# Patient Record
Sex: MALE | Race: WHITE | URBAN - METROPOLITAN AREA
[De-identification: names, ages, dates, MRNs, and addresses within clinical notes are randomized per-mention and may not be internally consistent; named-entity substitution may affect disease eponyms.]

---

## 2017-08-07 ENCOUNTER — TELEPHONE (OUTPATIENT)
Dept: FAMILY MEDICINE CLINIC | Facility: CLINIC | Age: 57
End: 2017-08-07

## 2017-08-07 NOTE — TELEPHONE ENCOUNTER
Recevied fax from [facility] with cpap compliance download  Flowsheet updated    Alisha Conley, 08/07/17, 2:36 PM

## 2018-08-28 ENCOUNTER — TELEPHONE (OUTPATIENT)
Dept: FAMILY MEDICINE CLINIC | Facility: CLINIC | Age: 58
End: 2018-08-28

## 2018-08-28 NOTE — TELEPHONE ENCOUNTER
Received DME form from [facility] Contre[facility] for patient's CPAP supplies. Patient is overdue for an appt. Spoke with patient who states he does not use [facility] anymore, he orders everything online.   Patient asked for us not to send a script to [facility] at [facility]

## 2018-12-10 ENCOUNTER — TELEPHONE (OUTPATIENT)
Dept: FAMILY MEDICINE CLINIC | Facility: CLINIC | Age: 58
End: 2018-12-10

## 2018-12-10 DIAGNOSIS — G47.33 OSA (OBSTRUCTIVE SLEEP APNEA): Primary | ICD-10-CM

## 2018-12-10 NOTE — TELEPHONE ENCOUNTER
needs script for cpap supplies.  we do not take his insurance until [place] but needs them before can be seen

## 2018-12-10 NOTE — TELEPHONE ENCOUNTER
We do not currently take patient's insurance and therefore he cannot be seen right now  States his insurance will change in January and he will be able to come in  States he needs CPAP supplies right away  [facility] for order?     Torrey Segovia, 12/10/18, 1:29

## 2018-12-10 NOTE — TELEPHONE ENCOUNTER
Refill medications until next scheduled office visit by protocol.    Conchis Mccormick, 12/10/18, 2:03 PM

## 2019-01-07 ENCOUNTER — OFFICE VISIT (OUTPATIENT)
Dept: FAMILY MEDICINE CLINIC | Facility: CLINIC | Age: 59
End: 2019-01-07
Payer: MEDICAID

## 2019-01-07 VITALS
BODY MASS INDEX: 40.94 KG/M2 | TEMPERATURE: 99 F | HEART RATE: 104 BPM | HEIGHT: 70.5 IN | SYSTOLIC BLOOD PRESSURE: 144 MMHG | DIASTOLIC BLOOD PRESSURE: 98 MMHG | RESPIRATION RATE: 18 BRPM | OXYGEN SATURATION: 97 % | WEIGHT: 289.19 LBS

## 2019-01-07 DIAGNOSIS — G47.33 OSA (OBSTRUCTIVE SLEEP APNEA): Primary | ICD-10-CM

## 2019-01-07 PROCEDURE — 99214 OFFICE O/P EST MOD 30 MIN: CPT | Performed by: FAMILY MEDICINE

## 2019-01-07 RX ORDER — ASPIRIN 81 MG/1
TABLET ORAL
COMMUNITY
Start: 2016-09-06

## 2019-01-07 RX ORDER — DILTIAZEM HYDROCHLORIDE 60 MG/1
TABLET, FILM COATED ORAL
Refills: 2 | COMMUNITY
Start: 2018-09-19 | End: 2019-01-10

## 2019-01-07 RX ORDER — ENALAPRIL MALEATE 20 MG/1
TABLET ORAL
Refills: 2 | COMMUNITY
Start: 2018-09-19 | End: 2019-01-10

## 2019-01-07 RX ORDER — METOPROLOL TARTRATE 50 MG/1
TABLET, FILM COATED ORAL
COMMUNITY
Start: 2015-02-24 | End: 2019-01-10

## 2019-01-07 RX ORDER — WARFARIN SODIUM 2.5 MG/1
TABLET ORAL DAILY
COMMUNITY
Start: 2016-09-06 | End: 2019-01-22

## 2019-01-07 RX ORDER — DOXAZOSIN MESYLATE 4 MG/1
TABLET ORAL
Refills: 2 | COMMUNITY
Start: 2018-09-21 | End: 2019-01-10

## 2019-01-08 ENCOUNTER — TELEPHONE (OUTPATIENT)
Dept: FAMILY MEDICINE CLINIC | Facility: CLINIC | Age: 59
End: 2019-01-08

## 2019-01-08 NOTE — PROGRESS NOTES
[facility]  [facility]        HPI:   This is a 59year old male coming in for Patient presents with:  Obstructive Sleep Apnea (KANG)      HPI:  He is using 2 different mask as and had no insurance for awhile and he interchanges b  for 10-15 years and now helping with the [employer] in [place].      Social History    Tobacco Use      Smoking status: Never Smoker      Smokeless tobacco: Never Used    Alcohol use: No      Frequency: Never    Drug use: Not on file    Family Hi weight: 213 lb 12.2 oz    Vital signs reviewed. Physical Exam   Constitutional: He is oriented to person, place, and time. He appears well-developed and well-nourished. HENT:   Head: Normocephalic and atraumatic.    Right Ear: External ear normal.   Left changed every 6 month  with the [employer] medical [employer] provider. Meds & Refills for this Visit:  Requested Prescriptions      No prescriptions requested or ordered in this encounter       Outcome: Parent verbalizes understanding.  Parent is notif

## 2019-01-08 NOTE — TELEPHONE ENCOUNTER
Patient is transferring care to Dr. Sudhakar Garner from [facility]. . Currently on coumadin therapy for Afib since 2016. Takes coumadin 5mg  Five times per week. (alternating days on and off)     Last INR 2-3 weeks ago.   States his goal range has been 2

## 2019-01-10 ENCOUNTER — ANTI-COAG VISIT (OUTPATIENT)
Dept: FAMILY MEDICINE CLINIC | Facility: CLINIC | Age: 59
End: 2019-01-10
Payer: MEDICAID

## 2019-01-10 ENCOUNTER — OFFICE VISIT (OUTPATIENT)
Dept: FAMILY MEDICINE CLINIC | Facility: CLINIC | Age: 59
End: 2019-01-10
Payer: MEDICAID

## 2019-01-10 VITALS
WEIGHT: 290.81 LBS | DIASTOLIC BLOOD PRESSURE: 84 MMHG | BODY MASS INDEX: 41.17 KG/M2 | RESPIRATION RATE: 18 BRPM | OXYGEN SATURATION: 98 % | SYSTOLIC BLOOD PRESSURE: 136 MMHG | TEMPERATURE: 97 F | HEART RATE: 76 BPM | HEIGHT: 70.5 IN

## 2019-01-10 DIAGNOSIS — I10 ESSENTIAL HYPERTENSION: Primary | ICD-10-CM

## 2019-01-10 DIAGNOSIS — L40.9 PSORIASIS: ICD-10-CM

## 2019-01-10 DIAGNOSIS — I25.10 CORONARY ARTERY DISEASE INVOLVING NATIVE HEART WITHOUT ANGINA PECTORIS, UNSPECIFIED VESSEL OR LESION TYPE: ICD-10-CM

## 2019-01-10 DIAGNOSIS — I48.91 ATRIAL FIBRILLATION, UNSPECIFIED TYPE (HCC): ICD-10-CM

## 2019-01-10 LAB — INR: 4 (ref 0.8–1.2)

## 2019-01-10 PROCEDURE — 99214 OFFICE O/P EST MOD 30 MIN: CPT | Performed by: FAMILY MEDICINE

## 2019-01-10 PROCEDURE — 85610 PROTHROMBIN TIME: CPT | Performed by: FAMILY MEDICINE

## 2019-01-10 RX ORDER — DILTIAZEM HYDROCHLORIDE 60 MG/1
TABLET, FILM COATED ORAL
Qty: 60 TABLET | Refills: 2 | Status: SHIPPED | OUTPATIENT
Start: 2019-01-10 | End: 2019-04-11

## 2019-01-10 RX ORDER — DOXAZOSIN MESYLATE 4 MG/1
TABLET ORAL
Qty: 30 TABLET | Refills: 2 | Status: SHIPPED | OUTPATIENT
Start: 2019-01-10 | End: 2019-04-11

## 2019-01-10 RX ORDER — METOPROLOL TARTRATE 50 MG/1
50 TABLET, FILM COATED ORAL 2 TIMES DAILY
Qty: 60 TABLET | Refills: 0 | Status: SHIPPED | OUTPATIENT
Start: 2019-01-10 | End: 2019-02-18

## 2019-01-10 RX ORDER — ENALAPRIL MALEATE 20 MG/1
TABLET ORAL
Qty: 60 TABLET | Refills: 2 | Status: SHIPPED | OUTPATIENT
Start: 2019-01-10 | End: 2019-04-11

## 2019-01-10 RX ORDER — TRIAMCINOLONE ACETONIDE 5 MG/G
1 OINTMENT TOPICAL 2 TIMES DAILY
Qty: 60 G | Refills: 1 | Status: SHIPPED | OUTPATIENT
Start: 2019-01-10 | End: 2019-04-22

## 2019-01-10 NOTE — PATIENT INSTRUCTIONS
Continue current medications  Advice low salt diet and exercise. Monitor your blood pressure. Return to clinic if systolic blood pressure more than 264 or diastolic more than 951. Follow coumadin clinic. Obtain old records.

## 2019-01-10 NOTE — PROGRESS NOTES
[facility] [place]  PROGRESS NOTE  Chief Complaint:   Patient presents with:  Blood Pressure      HPI:   This is a 59year old male with history of hypertension, atrial fibrillation and psoriasis presents to establish care.   Patient has been cinthia tab daily Disp:  Rfl:       Counseling given: Not Answered         REVIEW OF SYSTEMS:   CONSTITUTIONAL:  Denies unusual weight gain/loss, fever, chills, or fatigue. EYES:  Denies eye pain, visual loss, blurred vision, double vision or yellow sclerae.    I other lymphadenopathy. MUSCULOSKELETAL: Normal ROM, no joint pain, or muscle weakness in all extremity. BACK: No tenderness, no spasm, SLR test negative, FROM. NEUROLOGICAL:  No deficit, normal gait, strength and tone, normal reflexes.   PSYCHIATRIC: A today.     Problem List:  Patient Active Problem List:     Atrial fibrillation [facility])     Cardiac polyp     Obstructive sleep apnea     Essential hypertension     Psoriasis     Coronary artery disease involving native heart without angina pectoris      [place]

## 2019-01-16 ENCOUNTER — TELEPHONE (OUTPATIENT)
Dept: FAMILY MEDICINE CLINIC | Facility: CLINIC | Age: 59
End: 2019-01-16

## 2019-01-21 ENCOUNTER — ANTI-COAG VISIT (OUTPATIENT)
Dept: FAMILY MEDICINE CLINIC | Facility: CLINIC | Age: 59
End: 2019-01-21
Payer: MEDICAID

## 2019-01-21 DIAGNOSIS — I48.91 ATRIAL FIBRILLATION, UNSPECIFIED TYPE (HCC): ICD-10-CM

## 2019-01-21 LAB — INR: 3.5 (ref 0.8–1.2)

## 2019-01-21 PROCEDURE — 93793 ANTICOAG MGMT PT WARFARIN: CPT | Performed by: FAMILY MEDICINE

## 2019-01-21 PROCEDURE — 85610 PROTHROMBIN TIME: CPT | Performed by: FAMILY MEDICINE

## 2019-01-21 NOTE — PROGRESS NOTES
Here for INR check in coumadin clinic in the office. CURRENT COUMADIN DOSE:  0mg alternating with 5mg every other day.     CHANGES OR COMPLAINTS:  Back pain- muscle spasm- appointment with provider advised    INR RESULTS TODAY:  3.5 ( above goal)     RODGER

## 2019-01-22 DIAGNOSIS — I48.91 ATRIAL FIBRILLATION, UNSPECIFIED TYPE (HCC): Primary | ICD-10-CM

## 2019-01-22 RX ORDER — WARFARIN SODIUM 2.5 MG/1
TABLET ORAL
Qty: 30 TABLET | Refills: 5 | Status: SHIPPED | OUTPATIENT
Start: 2019-01-22 | End: 2019-08-19

## 2019-01-22 NOTE — TELEPHONE ENCOUNTER
Requesting refill of warfarin to [facility]. Current dose is 2.5mg tablet: one tablet daily. Last INR visit yesterday 1/21/19  Last office visit with Dr. Phillips Prior 1/10/19    Script pended.

## 2019-02-04 ENCOUNTER — TELEPHONE (OUTPATIENT)
Dept: FAMILY MEDICINE CLINIC | Facility: CLINIC | Age: 59
End: 2019-02-04

## 2019-02-05 ENCOUNTER — ANTI-COAG VISIT (OUTPATIENT)
Dept: FAMILY MEDICINE CLINIC | Facility: CLINIC | Age: 59
End: 2019-02-05
Payer: MEDICAID

## 2019-02-05 DIAGNOSIS — I48.91 ATRIAL FIBRILLATION, UNSPECIFIED TYPE (HCC): ICD-10-CM

## 2019-02-05 LAB — INR: 2.2 (ref 0.8–1.2)

## 2019-02-05 PROCEDURE — 93793 ANTICOAG MGMT PT WARFARIN: CPT | Performed by: FAMILY MEDICINE

## 2019-02-05 PROCEDURE — 85610 PROTHROMBIN TIME: CPT | Performed by: FAMILY MEDICINE

## 2019-02-05 NOTE — PROGRESS NOTES
Here for INR check in coumadin clinic in the office. CURRENT COUMADIN DOSE:  2.5mg daily     CHANGES OR COMPLAINTS:  No changes    INR RESULTS TODAY:  2.2 ( in goal)    PLAN:  CPM coumadin   Next INR due in one month  Appointment scheduled.    Written co

## 2019-02-07 ENCOUNTER — TELEPHONE (OUTPATIENT)
Dept: FAMILY MEDICINE CLINIC | Facility: CLINIC | Age: 59
End: 2019-02-07

## 2019-02-07 DIAGNOSIS — G47.33 OSA (OBSTRUCTIVE SLEEP APNEA): Primary | ICD-10-CM

## 2019-02-07 NOTE — TELEPHONE ENCOUNTER
Patient with Medicaid 1919 [facility] [place] [facility]"" is no longer in-network for cpap DME  Patient needs new DME  Patient advised I am currently working on this and just became aware of this 2 days ago  Patient advised I will call him back once I have an answer

## 2019-02-07 NOTE — TELEPHONE ENCOUNTER
where can he go local for supplies- masks & tubes for his c pap, he has [facility] for his insurance

## 2019-02-14 NOTE — TELEPHONE ENCOUNTER
Verfied with both [facility] in [place]  and [facility] in [facility] that they both take the [employer]  Discussed with patient  Patient states he would like to use [facility] is [place] as it is closer to

## 2019-02-15 NOTE — TELEPHONE ENCOUNTER
Everything has been faxed to [address] [place] [address] for patient's cpap supplies    Mary Muro, 02/15/19, 3:57 PM

## 2019-02-18 NOTE — TELEPHONE ENCOUNTER
Future appt:     Your appointments     Date & Time Appointment Department [facility])    Mar 05, 2019  2:00 PM CST Anti-Coag with EMG [address], [place] ([place] M[place])    Apr 08, 2019  6:00 PM CDT Sleep

## 2019-02-19 RX ORDER — METOPROLOL TARTRATE 50 MG/1
TABLET, FILM COATED ORAL
Qty: 60 TABLET | Refills: 1 | Status: SHIPPED | OUTPATIENT
Start: 2019-02-19 | End: 2019-04-11

## 2019-03-05 ENCOUNTER — TELEPHONE (OUTPATIENT)
Dept: FAMILY MEDICINE CLINIC | Facility: CLINIC | Age: 59
End: 2019-03-05

## 2019-03-05 NOTE — TELEPHONE ENCOUNTER
Patient just started a new job. 8-5pm. Would need to come in for INRs on Saturdays.    Patient advised that we do not have coumadin clinic hours on Saturday and he would need to either get his INR drawn in the lab, [facility] or at the hospital.     Patient was

## 2019-03-18 ENCOUNTER — TELEPHONE (OUTPATIENT)
Dept: FAMILY MEDICINE CLINIC | Facility: CLINIC | Age: 59
End: 2019-03-18

## 2019-03-25 ENCOUNTER — TELEPHONE (OUTPATIENT)
Dept: FAMILY MEDICINE CLINIC | Facility: CLINIC | Age: 59
End: 2019-03-25

## 2019-03-25 ENCOUNTER — ANTI-COAG VISIT (OUTPATIENT)
Dept: FAMILY MEDICINE CLINIC | Facility: CLINIC | Age: 59
End: 2019-03-25
Payer: MEDICAID

## 2019-03-25 DIAGNOSIS — I48.91 ATRIAL FIBRILLATION, UNSPECIFIED TYPE (HCC): ICD-10-CM

## 2019-03-25 LAB — INR: 2.4 (ref 0.8–1.2)

## 2019-03-25 PROCEDURE — 85610 PROTHROMBIN TIME: CPT | Performed by: FAMILY MEDICINE

## 2019-03-25 PROCEDURE — 93793 ANTICOAG MGMT PT WARFARIN: CPT | Performed by: FAMILY MEDICINE

## 2019-03-25 NOTE — TELEPHONE ENCOUNTER
Patient asking if there are long term affects of taking coumadin for many years? We discussed risk of bleeding and anemia, but I did not know of long term affects on other organs? Please advise. Thank you.

## 2019-03-25 NOTE — PROGRESS NOTES
Here for INR check in coumadin clinic in the office. CURRENT COUMADIN DOSE:  2.5mg daily     CHANGES OR COMPLAINTS:  No changes    INR RESULTS TODAY:  2.4 ( in goal)     PLAN:  CPM coumadin   Next INR due in one month  Appointment scheduled.    Written c

## 2019-03-26 NOTE — TELEPHONE ENCOUNTER
With all medication there may be long term effect on kidneys or liver.    Recommend to have regular follow up and blood work to monitor those on regular interval.

## 2019-04-08 ENCOUNTER — OFFICE VISIT (OUTPATIENT)
Dept: FAMILY MEDICINE CLINIC | Facility: CLINIC | Age: 59
End: 2019-04-08
Payer: MEDICAID

## 2019-04-08 VITALS
SYSTOLIC BLOOD PRESSURE: 138 MMHG | BODY MASS INDEX: 43.2 KG/M2 | RESPIRATION RATE: 18 BRPM | TEMPERATURE: 98 F | HEIGHT: 70.5 IN | HEART RATE: 88 BPM | OXYGEN SATURATION: 96 % | DIASTOLIC BLOOD PRESSURE: 80 MMHG | WEIGHT: 305.19 LBS

## 2019-04-08 DIAGNOSIS — E66.2 CLASS 3 OBESITY WITH ALVEOLAR HYPOVENTILATION, SERIOUS COMORBIDITY, AND BODY MASS INDEX (BMI) OF 40.0 TO 44.9 IN ADULT (HCC): Primary | ICD-10-CM

## 2019-04-08 DIAGNOSIS — G47.33 OSA (OBSTRUCTIVE SLEEP APNEA): ICD-10-CM

## 2019-04-08 PROCEDURE — 99214 OFFICE O/P EST MOD 30 MIN: CPT | Performed by: FAMILY MEDICINE

## 2019-04-08 NOTE — PROGRESS NOTES
[facility]        HPI:   This is a 62year old male coming in for Patient presents with:  Obstructive Sleep Apnea (KANG): sleep follow up      HPI:  Pt states that he feels no mask is comfortable because the pressure Smoking status: Never Smoker      Smokeless tobacco: Never Used    Alcohol use: No      Frequency: Never    Drug use: Not on file    Family History:  No family history on file.   Allergies:  No Known Allergies  Current Meds:    Current Outpatient Medication Wt Readings from Last 6 Encounters:  04/08/19 : (!) 305 lb 3.2 oz  01/10/19 : 290 lb 12.8 oz  01/07/19 : 289 lb 3.2 oz    BP Readings from Last 3 Encounters:  04/08/19 : 138/80  01/10/19 : 136/84  01/07/19 : (!) 144/98    Ideal body weight: 163 lb 7.5 oz Advised to refrain from driving when sleepy. COMPLIANCE is required by insurance for 4 hours a night most nights of the week. Recommend weight loss, and maintain and optimal BMI with Exercise 30 minutes most days of the week to target heart rate .

## 2019-04-09 DIAGNOSIS — G47.33 OBSTRUCTIVE SLEEP APNEA: Primary | ICD-10-CM

## 2019-04-10 NOTE — PROGRESS NOTES
[facility]        HPI:   This is a 62year old male coming in for No chief complaint on file. HPI: Patient continues to have a lot of trouble with his mask.    He notes that he feels like the pressure is too high [id number]  No results found for: B12, VITB12      Past Medical History:   Diagnosis Date   • Psoriasis      Past Surgical History:   Procedure Laterality Date   • ANGIOGRAM  05/2014    1 stent      Social History:  Social History    Social History Narrative mass index is 43.17 kg/m² as calculated from the following:    Height as of 4/8/19: 70.5\". Weight as of 4/8/19: 305 lb 3.2 oz. Neck in inches:       Wt Readings from Last 6 Encounters:  04/08/19 : (!) 305 lb 3.2 oz  01/10/19 : 290 lb 12.8 oz  01/07/19 using CPAP has a  7 fold increase in risk of heart attack, stroke, abnormal heart rhythm  and death,  increased risk of driving accidents. Advised to refrain from driving when sleepy.     COMPLIANCE is required by insurance for 4 hours a night most nights

## 2019-04-11 RX ORDER — METOPROLOL TARTRATE 50 MG/1
TABLET, FILM COATED ORAL
Qty: 60 TABLET | Refills: 2 | Status: SHIPPED | OUTPATIENT
Start: 2019-04-11 | End: 2019-08-19

## 2019-04-11 RX ORDER — ENALAPRIL MALEATE 20 MG/1
TABLET ORAL
Qty: 60 TABLET | Refills: 2 | Status: SHIPPED | OUTPATIENT
Start: 2019-04-11 | End: 2019-08-19

## 2019-04-11 RX ORDER — DOXAZOSIN MESYLATE 4 MG/1
TABLET ORAL
Qty: 30 TABLET | Refills: 2 | Status: SHIPPED | OUTPATIENT
Start: 2019-04-11 | End: 2019-08-19

## 2019-04-11 RX ORDER — DILTIAZEM HYDROCHLORIDE 60 MG/1
TABLET, FILM COATED ORAL
Qty: 60 TABLET | Refills: 2 | Status: SHIPPED | OUTPATIENT
Start: 2019-04-11 | End: 2019-08-19

## 2019-04-11 NOTE — TELEPHONE ENCOUNTER
Future Appointments   Date Time Provider Kimberly Zhu   4/22/2019  9:00 AM [facility] COUMADIN NURSE [facility] [place] [facility] [place]   4/22/2019  9:40 AM Rosette Jean MD [facility] [place] [facility] [place]   7/8/2019  6:20 PM Nahun Dominguez MD [facility] [place] [facility] [place]

## 2019-04-11 NOTE — TELEPHONE ENCOUNTER
Future Appointments   Date Time Provider Kimberly Zhu   4/22/2019  9:00 AM EMG COUMADIN NURSE [facility] [place] [facility] [place]   7/8/2019  6:20 PM Xi Daugherty MD [facility] [place] [facility] [place]     Recheck in 3 months.

## 2019-04-16 ENCOUNTER — TELEPHONE (OUTPATIENT)
Dept: FAMILY MEDICINE CLINIC | Facility: CLINIC | Age: 59
End: 2019-04-16

## 2019-04-16 NOTE — TELEPHONE ENCOUNTER
Patient wanting Dr Deniz Fabian aware that   IPA only pays for 1 CPap Mask per year. He will not be having his replaced every 4 weeks. Wanted Dr Deniz Fabian to know. Advised Patient to sanitize mask as well he can/agreed.   He will be checking with [employer] to see if

## 2019-04-22 ENCOUNTER — OFFICE VISIT (OUTPATIENT)
Dept: FAMILY MEDICINE CLINIC | Facility: CLINIC | Age: 59
End: 2019-04-22
Payer: MEDICAID

## 2019-04-22 ENCOUNTER — ANTI-COAG VISIT (OUTPATIENT)
Dept: FAMILY MEDICINE CLINIC | Facility: CLINIC | Age: 59
End: 2019-04-22
Payer: MEDICAID

## 2019-04-22 VITALS
HEART RATE: 114 BPM | DIASTOLIC BLOOD PRESSURE: 88 MMHG | TEMPERATURE: 97 F | WEIGHT: 307.38 LBS | HEIGHT: 70.5 IN | BODY MASS INDEX: 43.52 KG/M2 | RESPIRATION RATE: 20 BRPM | SYSTOLIC BLOOD PRESSURE: 132 MMHG

## 2019-04-22 DIAGNOSIS — E66.01 CLASS 3 SEVERE OBESITY DUE TO EXCESS CALORIES WITH SERIOUS COMORBIDITY AND BODY MASS INDEX (BMI) OF 40.0 TO 44.9 IN ADULT (HCC): ICD-10-CM

## 2019-04-22 DIAGNOSIS — I10 ESSENTIAL HYPERTENSION: Primary | ICD-10-CM

## 2019-04-22 DIAGNOSIS — I48.91 ATRIAL FIBRILLATION, UNSPECIFIED TYPE (HCC): ICD-10-CM

## 2019-04-22 PROCEDURE — 85610 PROTHROMBIN TIME: CPT | Performed by: FAMILY MEDICINE

## 2019-04-22 PROCEDURE — 99213 OFFICE O/P EST LOW 20 MIN: CPT | Performed by: FAMILY MEDICINE

## 2019-04-22 NOTE — PROGRESS NOTES
Here for INR check in coumadin clinic in the office.     CURRENT COUMADIN DOSE:  2.5mg daily     CHANGES OR COMPLAINTS:  Missed dosage    INR RESULTS TODAY:  1.7 ( below goal)    PLAN:  Increase coumadin dose today to 5mg   Tomorrow resume coumadin at 2.5mg

## 2019-04-22 NOTE — PATIENT INSTRUCTIONS
Blood pressure stable today. Advice low salt diet and exercise. Monitor your blood pressure. Return to clinic if systolic blood pressure more than 004 or diastolic more than 506.   Recommend healthy diet, exercise and weight loss  Continue current medicat

## 2019-04-22 NOTE — PROGRESS NOTES
[address]  PROGRESS NOTE  Chief Complaint:   Patient presents with:   Follow - Up      HPI:   This is a 62year old male with history of hypertension, atrial fibrillation and obesity presents for follow-up    Has  been compliant with cinthia lesion, or excessive skin dryness. CARDIOVASCULAR:  Denies chest pain, chest pressure, chest discomfort, palpitations, edema, dyspnea on exertion or at rest.  RESPIRATORY:  Denies shortness of breath, wheezing, cough or sputum.   GASTROINTESTINAL:  Denies intact, normal reflexes. PSYCHIATRIC: Alert and oriented x 3; affect appropriate, no depressed mood or anxiety      ASSESSMENT AND PLAN:   Magdiel Collazo was seen today for follow - up.     Diagnoses and all orders for this visit:    Essential hypertension    Atrial

## 2019-05-06 ENCOUNTER — ANTI-COAG VISIT (OUTPATIENT)
Dept: FAMILY MEDICINE CLINIC | Facility: CLINIC | Age: 59
End: 2019-05-06
Payer: MEDICAID

## 2019-05-06 DIAGNOSIS — Z79.01 LONG TERM (CURRENT) USE OF ANTICOAGULANTS: ICD-10-CM

## 2019-05-06 DIAGNOSIS — I48.91 ATRIAL FIBRILLATION, UNSPECIFIED TYPE (HCC): ICD-10-CM

## 2019-05-06 PROCEDURE — 85610 PROTHROMBIN TIME: CPT | Performed by: FAMILY MEDICINE

## 2019-05-06 PROCEDURE — 93793 ANTICOAG MGMT PT WARFARIN: CPT | Performed by: FAMILY MEDICINE

## 2019-05-06 NOTE — PROGRESS NOTES
Here for INR check in coumadin clinic in the office.     CURRENT COUMADIN DOSE:  5mg one day, then 2.5mg daily     CHANGES OR COMPLAINTS:  Dieting, but eating low vitamin K vegetables    INR RESULTS TODAY:  2.1 ( in goal)     PLAN:  Coumadin 2.5mg daily   N

## 2019-06-13 ENCOUNTER — TELEPHONE (OUTPATIENT)
Dept: FAMILY MEDICINE CLINIC | Facility: CLINIC | Age: 59
End: 2019-06-13

## 2019-06-13 NOTE — TELEPHONE ENCOUNTER
INR overdue. Patient states he started a new job and has to travel. He is in [place] right now for the next few weeks. Patient would like to get INR done at an outpatient lab in [place].  He will look for a lab and obtain fax number and call back

## 2019-06-20 ENCOUNTER — TELEPHONE (OUTPATIENT)
Dept: FAMILY MEDICINE CLINIC | Facility: CLINIC | Age: 59
End: 2019-06-20

## 2019-06-20 DIAGNOSIS — Z79.01 LONG TERM CURRENT USE OF ANTICOAGULANT THERAPY: Primary | ICD-10-CM

## 2019-06-20 NOTE — TELEPHONE ENCOUNTER
Order pended. Please print and sign with written signature. Return to me to fax to [address]. Thank you. Patient returning to [facility] first week of July.

## 2019-06-20 NOTE — TELEPHONE ENCOUNTER
Patient in [place] for work. He needs order for INR to [facility] in [place]. Fax # [phone]. Shira will pre-authorize lab work. So he asks that we write please pre-authorize at [facility]. # [phone].      [facility] Phone # [phone]

## 2019-06-24 ENCOUNTER — TELEPHONE (OUTPATIENT)
Dept: FAMILY MEDICINE CLINIC | Facility: CLINIC | Age: 59
End: 2019-06-24

## 2019-06-24 PROBLEM — Z79.01 LONG TERM (CURRENT) USE OF ANTICOAGULANTS: Status: ACTIVE | Noted: 2019-06-24

## 2019-06-24 NOTE — TELEPHONE ENCOUNTER
Patient notified that [facility] did not do their own prior authorization and provider has to do it.  Patient notified this process was started and [facility] community states it takes up to 4 days for approval. Advised that he would be notified when authorization recei

## 2019-06-24 NOTE — TELEPHONE ENCOUNTER
[address] to get standing INR order faxed to [employer] in [address] last week prior authorized. Spoke with Margoth Feliz at [facility]-. Prior auth started. Ref # [id number].     Last anticoagulation office visit notes faxed as requested

## 2019-06-25 ENCOUNTER — TELEPHONE (OUTPATIENT)
Dept: FAMILY MEDICINE CLINIC | Facility: CLINIC | Age: 59
End: 2019-06-25

## 2019-06-25 NOTE — TELEPHONE ENCOUNTER
Patient notified of authorization for protime tests  Patient to notify coumadin clinic the day he has blood drawn so we can watch for results.

## 2019-06-25 NOTE — TELEPHONE ENCOUNTER
Lilian Grissom from [address] [place] [address] called stating patient is authorized for standing order for protimes at [address] [place] [address] in [address] [place] [address] from 6/24/19-7/24/19. #10 draws. Any questions or to extend date of service, call her directly at [phone].     Authorizatio

## 2019-06-28 ENCOUNTER — ANTI-COAG VISIT (OUTPATIENT)
Dept: FAMILY MEDICINE CLINIC | Facility: CLINIC | Age: 59
End: 2019-06-28

## 2019-06-28 ENCOUNTER — TELEPHONE (OUTPATIENT)
Dept: FAMILY MEDICINE CLINIC | Facility: CLINIC | Age: 59
End: 2019-06-28

## 2019-06-28 DIAGNOSIS — I48.91 ATRIAL FIBRILLATION, UNSPECIFIED TYPE (HCC): ICD-10-CM

## 2019-06-28 DIAGNOSIS — Z79.01 LONG TERM (CURRENT) USE OF ANTICOAGULANTS: ICD-10-CM

## 2019-06-28 NOTE — PROGRESS NOTES
INR checked at [facility] in [facility] [address] COUMADIN DOSE:  2.5mg daily     CHANGES OR COMPLAINTS:  Traveling in [place] changes  URI this past week - now resolved  Took clorciden.     INR RESULTS TODAY:  1.9 ( slightly be

## 2019-07-08 ENCOUNTER — OFFICE VISIT (OUTPATIENT)
Dept: FAMILY MEDICINE CLINIC | Facility: CLINIC | Age: 59
End: 2019-07-08
Payer: MEDICAID

## 2019-07-08 VITALS
RESPIRATION RATE: 18 BRPM | DIASTOLIC BLOOD PRESSURE: 86 MMHG | SYSTOLIC BLOOD PRESSURE: 126 MMHG | HEIGHT: 70.5 IN | TEMPERATURE: 98 F | WEIGHT: 299.19 LBS | OXYGEN SATURATION: 97 % | HEART RATE: 106 BPM | BODY MASS INDEX: 42.36 KG/M2

## 2019-07-08 DIAGNOSIS — E66.01 CLASS 3 SEVERE OBESITY DUE TO EXCESS CALORIES WITH SERIOUS COMORBIDITY AND BODY MASS INDEX (BMI) OF 40.0 TO 44.9 IN ADULT (HCC): ICD-10-CM

## 2019-07-08 DIAGNOSIS — I48.20 CHRONIC ATRIAL FIBRILLATION (HCC): ICD-10-CM

## 2019-07-08 DIAGNOSIS — G47.33 OSA (OBSTRUCTIVE SLEEP APNEA): Primary | ICD-10-CM

## 2019-07-08 PROCEDURE — 93000 ELECTROCARDIOGRAM COMPLETE: CPT | Performed by: FAMILY MEDICINE

## 2019-07-08 PROCEDURE — 99214 OFFICE O/P EST MOD 30 MIN: CPT | Performed by: FAMILY MEDICINE

## 2019-07-08 NOTE — PROGRESS NOTES
[facility]  [facility]LE[facility]        HPI:   This is a 62year old male coming in for Patient presents with:  Obstructive Sleep Apnea (KANG)      HPI:  Last week he developed pain with his mask and so he went from the hybrid back to the TRANSFERRIN, TIBCP, IRONBIND, SAT, SATUR  No results found for: JESSA  No results found for: VITD, QVITD, VITD25, SICM48QK  No results found for: B12, VITB12      Past Medical History:   Diagnosis Date   • Psoriasis      Past Surgical History:   Procedure La month thought it was the air conditioner). Cardiovascular: Negative. Neurological: Negative. Psychiatric/Behavioral: Positive for sleep disturbance.        EXAM:   /86 (BP Location: Left arm, Patient Position: Sitting, Cuff Size: large)   Pul content normal.     EKG done and reviewed by me with patient. ASSESSMENT AND PLAN:   1. Chronic atrial fibrillation (HCC)  - ELECTROCARDIOGRAM, COMPLETE    2. KANG (obstructive sleep apnea)    3.  Class 3 severe obesity due to excess calories with dulce result of today. \" This note was created utilizing Dragon speech recognition software.  Please excuse any grammatical errors. Call my office if you have any questions regarding this note.  \"     Glenis Brsuh MD  7/8/2019  6:34 PM

## 2019-07-11 ENCOUNTER — TELEPHONE (OUTPATIENT)
Dept: FAMILY MEDICINE CLINIC | Facility: CLINIC | Age: 59
End: 2019-07-11

## 2019-07-11 ENCOUNTER — ANTI-COAG VISIT (OUTPATIENT)
Dept: FAMILY MEDICINE CLINIC | Facility: CLINIC | Age: 59
End: 2019-07-11
Payer: MEDICAID

## 2019-07-11 ENCOUNTER — OFFICE VISIT (OUTPATIENT)
Dept: FAMILY MEDICINE CLINIC | Facility: CLINIC | Age: 59
End: 2019-07-11
Payer: MEDICAID

## 2019-07-11 VITALS
RESPIRATION RATE: 18 BRPM | HEIGHT: 70.5 IN | TEMPERATURE: 97 F | WEIGHT: 296 LBS | SYSTOLIC BLOOD PRESSURE: 116 MMHG | BODY MASS INDEX: 41.9 KG/M2 | OXYGEN SATURATION: 98 % | HEART RATE: 69 BPM | DIASTOLIC BLOOD PRESSURE: 86 MMHG

## 2019-07-11 DIAGNOSIS — L03.119 CELLULITIS OF LOWER EXTREMITY, UNSPECIFIED LATERALITY: Primary | ICD-10-CM

## 2019-07-11 DIAGNOSIS — Z79.01 LONG TERM (CURRENT) USE OF ANTICOAGULANTS: ICD-10-CM

## 2019-07-11 DIAGNOSIS — M54.2 NECK PAIN: ICD-10-CM

## 2019-07-11 DIAGNOSIS — I10 ESSENTIAL HYPERTENSION: ICD-10-CM

## 2019-07-11 DIAGNOSIS — I48.91 ATRIAL FIBRILLATION, UNSPECIFIED TYPE (HCC): ICD-10-CM

## 2019-07-11 DIAGNOSIS — Z12.11 COLON CANCER SCREENING: ICD-10-CM

## 2019-07-11 DIAGNOSIS — L40.9 PSORIASIS: ICD-10-CM

## 2019-07-11 DIAGNOSIS — R05.9 COUGH: ICD-10-CM

## 2019-07-11 DIAGNOSIS — L02.92 FURUNCLE: ICD-10-CM

## 2019-07-11 DIAGNOSIS — L85.3 DRY SKIN DERMATITIS: ICD-10-CM

## 2019-07-11 LAB — INR: 2.1 (ref 0.8–1.2)

## 2019-07-11 PROCEDURE — 85610 PROTHROMBIN TIME: CPT | Performed by: FAMILY MEDICINE

## 2019-07-11 PROCEDURE — 99215 OFFICE O/P EST HI 40 MIN: CPT | Performed by: FAMILY MEDICINE

## 2019-07-11 RX ORDER — CEPHALEXIN 500 MG/1
500 CAPSULE ORAL 3 TIMES DAILY
Qty: 30 CAPSULE | Refills: 0 | Status: SHIPPED | OUTPATIENT
Start: 2019-07-11 | End: 2019-07-21

## 2019-07-11 RX ORDER — FLUTICASONE PROPIONATE 50 MCG
1 SPRAY, SUSPENSION (ML) NASAL DAILY
Qty: 1 BOTTLE | Refills: 0 | Status: SHIPPED | OUTPATIENT
Start: 2019-07-11 | End: 2020-02-03 | Stop reason: ALTCHOICE

## 2019-07-11 NOTE — PROGRESS NOTES
[address]  PROGRESS NOTE  Chief Complaint:   Patient presents with:   Other: would also like fast blood work  Derm Problem  Cough  Chest Congestion: nasal drainage , 2-3 weeks  Neck Pain: range of motion is limited due to pain  Lesion: a  for 10-15 years and now helping with the [employer] in [place]. Family History:  History reviewed. No pertinent family history.   Allergies:  No Known Allergies  Current Meds:    Current Outpatient Medications:  cephALEXin 500 MG Oral Ramone Tubbs excessive sweating, cold or heat intolerance, polyuria or polydipsia.       EXAM:   /86 (BP Location: Left arm, Patient Position: Sitting, Cuff Size: thigh)   Pulse 69   Temp 97.4 °F (36.3 °C) (Tympanic)   Resp 18   Ht 70.5\"   Wt 296 lb   SpO2 98% deficit, normal gait, strength and tone, normal reflexes.   PSYCHIATRIC: Alert and oriented x 3; affect appropriate, no depressed mood or anxiety      ASSESSMENT AND PLAN:   Meggan Calderon was seen today for other, derm problem, cough, chest congestion, neck pain, le 01/16/2008  Colonoscopy due on 01/16/2008  PSA due on 01/16/2008    Patient/Caregiver Education: Patient/Caregiver Education: There are no barriers to learning. Medical education done. Outcome: Patient verbalizes understanding.  Patient is notified to favio

## 2019-07-11 NOTE — TELEPHONE ENCOUNTER
[facility] states patient is out of network and they would not be able to bill for the ASV machine. Pharmacy states they have also contacted patient.

## 2019-07-11 NOTE — PROGRESS NOTES
Here for INR check in coumadin clinic in the office.   Also appointment with Dr. Miguel Minaya today    CURRENT COUMADIN DOSE:  2.5mg daily     CHANGES OR COMPLAINTS:  Travel    INR RESULTS TODAY:  2.1 ( in goal)     PLAN:  CPM coumadin   Next INR due in one month

## 2019-07-11 NOTE — PATIENT INSTRUCTIONS
HR stable today. Blood pressure stable. Continue current medications  Advice low salt diet and exercise. Monitor your blood pressure and HR. Return to clinic if systolic blood pressure more than 115 or diastolic more than 196.   Return to clinic if HR m

## 2019-07-12 NOTE — TELEPHONE ENCOUNTER
Please call [facility]o [facility]ast[facility] and see if they can tell us who is in network. Also please talk with Pham Lagos like to know if he thinks that medicaid will cover an ASV.   I do not want to send him through a titration If they dont' think that it will cover the [facility]

## 2019-07-12 NOTE — TELEPHONE ENCOUNTER
Kourtney Chaudhary provided [employer] as an in network provider for pt. Ph# [phone] for them,  to possibly cover ASV- equipment. Please advise on what you would like done next.

## 2019-07-15 NOTE — TELEPHONE ENCOUNTER
Spoke with patient and he state he would like to see first if anywhere would cover the cost of an ASV machine before he undergoes the study.  Pt states he has used [facility] in [place] and will contact them and he was also given the number to [facility]

## 2019-07-18 ENCOUNTER — ANTI-COAG VISIT (OUTPATIENT)
Dept: FAMILY MEDICINE CLINIC | Facility: CLINIC | Age: 59
End: 2019-07-18
Payer: MEDICAID

## 2019-07-18 DIAGNOSIS — Z79.01 LONG TERM (CURRENT) USE OF ANTICOAGULANTS: ICD-10-CM

## 2019-07-18 DIAGNOSIS — I48.91 ATRIAL FIBRILLATION, UNSPECIFIED TYPE (HCC): ICD-10-CM

## 2019-07-18 LAB — INR: 2.1 (ref 0.8–1.2)

## 2019-07-18 PROCEDURE — 93793 ANTICOAG MGMT PT WARFARIN: CPT | Performed by: FAMILY MEDICINE

## 2019-07-18 PROCEDURE — 85610 PROTHROMBIN TIME: CPT | Performed by: FAMILY MEDICINE

## 2019-07-18 NOTE — PROGRESS NOTES
Here for INR check in coumadin clinic in the office. CURRENT COUMADIN DOSE:  2.5mg daily     CHANGES OR COMPLAINTS:  Cephalexin for the past 7 days ( 3 days left) for cellulitis in leg. Patient states leg is improving.     INR RESULTS TODAY:  2.1 ( in g

## 2019-07-24 ENCOUNTER — TELEPHONE (OUTPATIENT)
Dept: FAMILY MEDICINE CLINIC | Facility: CLINIC | Age: 59
End: 2019-07-24

## 2019-07-24 NOTE — TELEPHONE ENCOUNTER
regarding cellulitis. finished antibiotic and is not all gone.  wants to know if needs another round of rx

## 2019-07-24 NOTE — TELEPHONE ENCOUNTER
Patient denied making appt patient is currently in [place] and isn't sure when he will be back. Patient denied going to a walk in clinic in [place] as well. Will call back when he returns to be seen. No other questions at this time.

## 2019-07-24 NOTE — TELEPHONE ENCOUNTER
Patient states that he finished the abx but the redness has not gone away all the way on the thigh. Patient states that its gotten better but its still red.  Patient states that the location its at hes not sure if its still the cellulitis or if its just

## 2019-08-19 DIAGNOSIS — I48.91 ATRIAL FIBRILLATION, UNSPECIFIED TYPE (HCC): ICD-10-CM

## 2019-08-19 NOTE — TELEPHONE ENCOUNTER
Future appt:     Your appointments     Date & Time Appointment Department [facility])    Sep 06, 2019 10:15 AM CDT Anti-Coag with EMG [address], [place] ([place]) [address], [address]

## 2019-08-20 RX ORDER — METOPROLOL TARTRATE 50 MG/1
TABLET, FILM COATED ORAL
Qty: 60 TABLET | Refills: 0 | Status: SHIPPED | OUTPATIENT
Start: 2019-08-20 | End: 2019-10-09

## 2019-08-20 RX ORDER — DILTIAZEM HYDROCHLORIDE 60 MG/1
TABLET, FILM COATED ORAL
Qty: 60 TABLET | Refills: 0 | Status: SHIPPED | OUTPATIENT
Start: 2019-08-20 | End: 2019-09-07

## 2019-08-20 RX ORDER — WARFARIN SODIUM 2.5 MG/1
TABLET ORAL
Qty: 30 TABLET | Refills: 0 | Status: SHIPPED | OUTPATIENT
Start: 2019-08-20 | End: 2019-10-07

## 2019-08-20 RX ORDER — ENALAPRIL MALEATE 20 MG/1
TABLET ORAL
Qty: 60 TABLET | Refills: 0 | Status: SHIPPED | OUTPATIENT
Start: 2019-08-20 | End: 2019-10-09

## 2019-08-20 RX ORDER — DOXAZOSIN MESYLATE 4 MG/1
TABLET ORAL
Qty: 30 TABLET | Refills: 2 | Status: SHIPPED | OUTPATIENT
Start: 2019-08-20 | End: 2019-10-09

## 2019-09-06 ENCOUNTER — ANTI-COAG VISIT (OUTPATIENT)
Dept: FAMILY MEDICINE CLINIC | Facility: CLINIC | Age: 59
End: 2019-09-06
Payer: MEDICAID

## 2019-09-06 ENCOUNTER — TELEPHONE (OUTPATIENT)
Dept: FAMILY MEDICINE CLINIC | Facility: CLINIC | Age: 59
End: 2019-09-06

## 2019-09-06 DIAGNOSIS — Z79.01 LONG TERM (CURRENT) USE OF ANTICOAGULANTS: ICD-10-CM

## 2019-09-06 LAB — INR: 2.1 (ref 0.8–1.2)

## 2019-09-06 PROCEDURE — 85610 PROTHROMBIN TIME: CPT | Performed by: FAMILY MEDICINE

## 2019-09-06 PROCEDURE — 93793 ANTICOAG MGMT PT WARFARIN: CPT | Performed by: FAMILY MEDICINE

## 2019-09-06 NOTE — PROGRESS NOTES
Here for INR check in coumadin clinic in the office. CURRENT COUMADIN DOSE:  2.5mg daily     CHANGES OR COMPLAINTS:  No changes    INR RESULTS TODAY:  2.5 ( in goal)     PLAN:  CPM coumadin   Next INR in one month. Appointment scheduled.      Flu vacci

## 2019-09-09 RX ORDER — DILTIAZEM HYDROCHLORIDE 60 MG/1
TABLET, FILM COATED ORAL
Qty: 60 TABLET | Refills: 0 | Status: SHIPPED | OUTPATIENT
Start: 2019-09-09 | End: 2019-10-09

## 2019-09-09 NOTE — TELEPHONE ENCOUNTER
Future Appointments   Date Time Provider Kimberly Zhu   10/4/2019  9:30 AM EMG COUMADIN NURSE [facility] [place] [facility] [place]   10/4/2019 10:00 AM [facility] [place] NURSE [facility] [place] [facility] [place]      Return in about 2 months (around 9/11/2019),

## 2019-09-09 NOTE — TELEPHONE ENCOUNTER
Future Appointments   Date Time Provider Kimberly Zhu   10/4/2019  9:30 AM EMG COUMADIN NURSE [facility] [place] [facility] [place]   10/4/2019 10:00 AM [facility] [place] NURSE [facility] [place] [facility] [place]        Return in about 2 months (around 9/11/2019),

## 2019-10-07 DIAGNOSIS — I48.91 ATRIAL FIBRILLATION, UNSPECIFIED TYPE (HCC): ICD-10-CM

## 2019-10-07 RX ORDER — WARFARIN SODIUM 2.5 MG/1
TABLET ORAL
Qty: 30 TABLET | Refills: 0 | Status: SHIPPED | OUTPATIENT
Start: 2019-10-07 | End: 2019-11-04

## 2019-10-07 NOTE — TELEPHONE ENCOUNTER
Future Appointments   Date Time Provider Kimberly Zhu   10/10/2019  1:00 PM EMG COUMADIN NURSE [facility] [place] [facility] [place]      Return in about 2 months (around 9/11/2019)

## 2019-10-07 NOTE — TELEPHONE ENCOUNTER
Patient is currently out of town working. Patient is working in [place] and he got notice that he will be staying another week. Patient is also wondering why he cant get refills for at least every 6 months not every month or 2 months?     Dr Jessica pearson

## 2019-10-09 ENCOUNTER — TELEPHONE (OUTPATIENT)
Dept: FAMILY MEDICINE CLINIC | Facility: CLINIC | Age: 59
End: 2019-10-09

## 2019-10-09 RX ORDER — ENALAPRIL MALEATE 20 MG/1
TABLET ORAL
Qty: 60 TABLET | Refills: 0 | Status: SHIPPED | OUTPATIENT
Start: 2019-10-09 | End: 2019-11-04

## 2019-10-09 RX ORDER — METOPROLOL TARTRATE 50 MG/1
50 TABLET, FILM COATED ORAL 2 TIMES DAILY
Qty: 60 TABLET | Refills: 0 | Status: SHIPPED | OUTPATIENT
Start: 2019-10-09 | End: 2019-11-04

## 2019-10-09 RX ORDER — DOXAZOSIN MESYLATE 4 MG/1
4 TABLET ORAL
Qty: 30 TABLET | Refills: 0 | Status: SHIPPED | OUTPATIENT
Start: 2019-10-09 | End: 2019-11-04

## 2019-10-09 RX ORDER — DILTIAZEM HYDROCHLORIDE 60 MG/1
TABLET, FILM COATED ORAL
Qty: 60 TABLET | Refills: 0 | Status: SHIPPED | OUTPATIENT
Start: 2019-10-09 | End: 2019-11-04

## 2019-10-09 NOTE — TELEPHONE ENCOUNTER
No future appointments. Patient is currently working in [place] and will be back at the end of month. Patient patient will call once he returns back to set up appt. Patient is asking for 1 refill to get him by.

## 2019-10-28 ENCOUNTER — TELEPHONE (OUTPATIENT)
Dept: FAMILY MEDICINE CLINIC | Facility: CLINIC | Age: 59
End: 2019-10-28

## 2019-10-28 NOTE — TELEPHONE ENCOUNTER
INR over due. Last INR was on 9/6/19. Patient has been working in [place]. He will return this Thursday. INR scheduled for Friday 11/1/19.

## 2019-11-04 ENCOUNTER — ANTI-COAG VISIT (OUTPATIENT)
Dept: FAMILY MEDICINE CLINIC | Facility: CLINIC | Age: 59
End: 2019-11-04
Payer: MEDICAID

## 2019-11-04 DIAGNOSIS — L40.9 PSORIASIS: Primary | ICD-10-CM

## 2019-11-04 DIAGNOSIS — I48.91 ATRIAL FIBRILLATION, UNSPECIFIED TYPE (HCC): ICD-10-CM

## 2019-11-04 DIAGNOSIS — Z79.01 LONG TERM (CURRENT) USE OF ANTICOAGULANTS: ICD-10-CM

## 2019-11-04 DIAGNOSIS — Z00.00 PHYSICAL EXAM: ICD-10-CM

## 2019-11-04 PROCEDURE — 85610 PROTHROMBIN TIME: CPT | Performed by: FAMILY MEDICINE

## 2019-11-04 RX ORDER — DOXAZOSIN MESYLATE 4 MG/1
4 TABLET ORAL
Qty: 30 TABLET | Refills: 0 | Status: SHIPPED | OUTPATIENT
Start: 2019-11-04 | End: 2019-12-12

## 2019-11-04 RX ORDER — METOPROLOL TARTRATE 50 MG/1
50 TABLET, FILM COATED ORAL 2 TIMES DAILY
Qty: 60 TABLET | Refills: 0 | Status: SHIPPED | OUTPATIENT
Start: 2019-11-04 | End: 2019-12-12

## 2019-11-04 RX ORDER — DILTIAZEM HYDROCHLORIDE 60 MG/1
TABLET, FILM COATED ORAL
Qty: 60 TABLET | Refills: 0 | Status: SHIPPED | OUTPATIENT
Start: 2019-11-04 | End: 2019-12-12

## 2019-11-04 RX ORDER — ENALAPRIL MALEATE 20 MG/1
TABLET ORAL
Qty: 60 TABLET | Refills: 0 | Status: SHIPPED | OUTPATIENT
Start: 2019-11-04 | End: 2019-12-12

## 2019-11-04 RX ORDER — WARFARIN SODIUM 2.5 MG/1
TABLET ORAL
Qty: 30 TABLET | Refills: 0 | Status: SHIPPED | OUTPATIENT
Start: 2019-11-04 | End: 2019-12-12

## 2019-11-04 NOTE — TELEPHONE ENCOUNTER
Patient has returned home from [place]. Patient due for physical and requesting to have lab work done prior to physical. Orders are needed. If orders are placed, patient needs to then be notified and helped to schedule a physical and lab appt.      Author Renuka

## 2019-11-04 NOTE — PROGRESS NOTES
Here for INR check in coumadin clinic in the office.     CURRENT COUMADIN DOSE:   2.5mg daily     CHANGES/COMPLAINTS:  Working in [place] for last 2 months    INR RESULT:  2.0 ( in goal)    PLAN:   CPM coumadin   Next INR in one month  Appointment schedu

## 2019-11-04 NOTE — TELEPHONE ENCOUNTER
Future Appointments   Date Time Provider Kimberly Audra   11/8/2019 10:15 AM REF [place] REF [facility]   11/12/2019  9:20 AM Danyelle Akins MD [facility] [place] [facility] [place]   12/2/2019  1:30 PM EMG COUMADIN NURSE [facility] [place] [facility] [place]

## 2019-11-08 ENCOUNTER — LABORATORY ENCOUNTER (OUTPATIENT)
Dept: LAB | Age: 59
End: 2019-11-08
Attending: FAMILY MEDICINE
Payer: MEDICAID

## 2019-11-08 DIAGNOSIS — Z00.00 PHYSICAL EXAM: ICD-10-CM

## 2019-11-08 PROCEDURE — 80061 LIPID PANEL: CPT

## 2019-11-08 PROCEDURE — 36415 COLL VENOUS BLD VENIPUNCTURE: CPT

## 2019-11-08 PROCEDURE — 80053 COMPREHEN METABOLIC PANEL: CPT

## 2019-11-08 PROCEDURE — 85025 COMPLETE CBC W/AUTO DIFF WBC: CPT

## 2019-11-08 PROCEDURE — 83036 HEMOGLOBIN GLYCOSYLATED A1C: CPT

## 2019-11-08 PROCEDURE — 81001 URINALYSIS AUTO W/SCOPE: CPT

## 2019-11-08 PROCEDURE — 84443 ASSAY THYROID STIM HORMONE: CPT

## 2019-11-12 ENCOUNTER — OFFICE VISIT (OUTPATIENT)
Dept: FAMILY MEDICINE CLINIC | Facility: CLINIC | Age: 59
End: 2019-11-12
Payer: MEDICAID

## 2019-11-12 VITALS
WEIGHT: 304 LBS | BODY MASS INDEX: 43.04 KG/M2 | HEART RATE: 72 BPM | SYSTOLIC BLOOD PRESSURE: 126 MMHG | DIASTOLIC BLOOD PRESSURE: 76 MMHG | HEIGHT: 70.5 IN | TEMPERATURE: 98 F

## 2019-11-12 DIAGNOSIS — I48.91 ATRIAL FIBRILLATION, UNSPECIFIED TYPE (HCC): ICD-10-CM

## 2019-11-12 DIAGNOSIS — I10 ESSENTIAL HYPERTENSION: ICD-10-CM

## 2019-11-12 DIAGNOSIS — Z12.11 COLON CANCER SCREENING: ICD-10-CM

## 2019-11-12 DIAGNOSIS — Z00.00 PHYSICAL EXAM: Primary | ICD-10-CM

## 2019-11-12 DIAGNOSIS — R73.03 PREDIABETES: ICD-10-CM

## 2019-11-12 PROCEDURE — 99396 PREV VISIT EST AGE 40-64: CPT | Performed by: FAMILY MEDICINE

## 2019-11-12 NOTE — PATIENT INSTRUCTIONS
Continue current medications  Labs reviewed. A1c and glucose in prediabetes range.    Advice low carb in diet, AVOID FOOD WITH HIGH GLYCEMIC INDEX, have smaller portion meal, avoid bread, pasta and potatoes, no juice or soda, don't eat late at night, exer

## 2019-11-12 NOTE — PROGRESS NOTES
[address]    Chief Complaint:   Patient presents with:  Physical  Fall: 11/6/19      HPI:   Paul Luther is a 62year old male who presents for an Annual Health Visit.    Patient has history of hypertension, prediabetes and atrial f History    Patient does not qualify to have social determinant information on file (likely too young). 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         REVIEW OF bulging, no retraction, no fluid, landmarks normal.  NECK: supple; Full ROM; no JVD, no TMG, no carotid bruits  RESPIRATORY: Clear to auscultation bilterally, no rales/rhonchi/wheezing.   CARDIOVASCULAR: S1, S2 normal, RRR; no S3, no S4; no click; murmur ne hypertension     Psoriasis     Coronary artery disease involving native heart without angina pectoris     Class 3 severe obesity due to excess calories with serious comorbidity and body mass index (BMI) of 40.0 to 44.9 in adult [facility])     Long term (current

## 2019-12-12 DIAGNOSIS — I48.91 ATRIAL FIBRILLATION, UNSPECIFIED TYPE (HCC): ICD-10-CM

## 2019-12-12 DIAGNOSIS — L40.9 PSORIASIS: ICD-10-CM

## 2019-12-12 RX ORDER — DILTIAZEM HYDROCHLORIDE 60 MG/1
TABLET, FILM COATED ORAL
Qty: 90 TABLET | Refills: 0 | Status: SHIPPED | OUTPATIENT
Start: 2019-12-12 | End: 2019-12-12

## 2019-12-12 RX ORDER — ENALAPRIL MALEATE 20 MG/1
TABLET ORAL
Qty: 180 TABLET | Refills: 0 | Status: SHIPPED | OUTPATIENT
Start: 2019-12-12 | End: 2019-12-12

## 2019-12-12 RX ORDER — WARFARIN SODIUM 2.5 MG/1
TABLET ORAL
Qty: 90 TABLET | Refills: 1 | Status: SHIPPED | OUTPATIENT
Start: 2019-12-12 | End: 2020-01-31

## 2019-12-12 RX ORDER — DOXAZOSIN MESYLATE 4 MG/1
TABLET ORAL
Qty: 90 TABLET | Refills: 0 | Status: SHIPPED | OUTPATIENT
Start: 2019-12-12 | End: 2019-12-12

## 2019-12-12 RX ORDER — METOPROLOL TARTRATE 50 MG/1
TABLET, FILM COATED ORAL
Qty: 180 TABLET | Refills: 0 | Status: SHIPPED | OUTPATIENT
Start: 2019-12-12 | End: 2019-12-12

## 2019-12-12 RX ORDER — METOPROLOL TARTRATE 50 MG/1
TABLET, FILM COATED ORAL
Qty: 180 TABLET | Refills: 1 | Status: SHIPPED | OUTPATIENT
Start: 2019-12-12 | End: 2020-03-10

## 2019-12-12 RX ORDER — DILTIAZEM HYDROCHLORIDE 60 MG/1
60 TABLET, FILM COATED ORAL 2 TIMES DAILY
Qty: 180 TABLET | Refills: 1 | Status: SHIPPED | OUTPATIENT
Start: 2019-12-12 | End: 2020-04-03

## 2019-12-12 RX ORDER — WARFARIN SODIUM 2.5 MG/1
TABLET ORAL
Qty: 90 TABLET | Refills: 0 | Status: SHIPPED | OUTPATIENT
Start: 2019-12-12 | End: 2019-12-12

## 2019-12-12 RX ORDER — DOXAZOSIN MESYLATE 4 MG/1
4 TABLET ORAL
Qty: 90 TABLET | Refills: 1 | Status: SHIPPED | OUTPATIENT
Start: 2019-12-12 | End: 2020-03-10

## 2019-12-12 RX ORDER — ENALAPRIL MALEATE 20 MG/1
TABLET ORAL
Qty: 180 TABLET | Refills: 1 | Status: SHIPPED | OUTPATIENT
Start: 2019-12-12 | End: 2020-03-10

## 2019-12-12 NOTE — TELEPHONE ENCOUNTER
Patient state that he was just seen recently and all of his medications were supposed to be refill but there's nothing at his pharmacy.  Patient would like Dr to refill all of his medications and send them to [facility] pharmacy, states that he needs today [facility]

## 2019-12-12 NOTE — TELEPHONE ENCOUNTER
Patient would like to have 6 month supply sent to pharmacy. Future Appointments   Date Time Provider Kimberly Zhu   12/26/2019 10:30 AM [facility] NURSE [facility] [place] [facility] [place]      Return in about 6 months (around 5/12/2020) for follow up.

## 2019-12-12 NOTE — TELEPHONE ENCOUNTER
Future appt:     Your appointments     Date & Time Appointment Department [facility])    Dec 26, 2019 10:30 AM CST Anti-Coag with [facility] [address], [place] ([place])            [place] [address]

## 2019-12-16 ENCOUNTER — ANTI-COAG VISIT (OUTPATIENT)
Dept: FAMILY MEDICINE CLINIC | Facility: CLINIC | Age: 59
End: 2019-12-16
Payer: MEDICAID

## 2019-12-16 DIAGNOSIS — Z79.01 LONG TERM (CURRENT) USE OF ANTICOAGULANTS: ICD-10-CM

## 2019-12-16 DIAGNOSIS — I48.91 ATRIAL FIBRILLATION, UNSPECIFIED TYPE (HCC): ICD-10-CM

## 2019-12-16 PROCEDURE — 93793 ANTICOAG MGMT PT WARFARIN: CPT

## 2019-12-16 PROCEDURE — 85610 PROTHROMBIN TIME: CPT | Performed by: FAMILY MEDICINE

## 2019-12-16 NOTE — PROGRESS NOTES
Let pt know the following below. Pt verbalized his understanding and had no other questions at this time.    Future Appointments   Date Time Provider Kimberly Zhu   12/23/2019  1:45 PM [facility] [place] NURSE [facility] [place] [facility] [place]

## 2019-12-16 NOTE — PROGRESS NOTES
Recommend change in Coumadin dose. Take 5 mg Monday and Wednesday and 2.5 mg rest of week. Recheck on next Monday at 12/23/2019.

## 2019-12-23 ENCOUNTER — ANTI-COAG VISIT (OUTPATIENT)
Dept: FAMILY MEDICINE CLINIC | Facility: CLINIC | Age: 59
End: 2019-12-23
Payer: MEDICAID

## 2019-12-23 DIAGNOSIS — I48.91 ATRIAL FIBRILLATION, UNSPECIFIED TYPE (HCC): ICD-10-CM

## 2019-12-23 DIAGNOSIS — Z79.01 LONG TERM (CURRENT) USE OF ANTICOAGULANTS: ICD-10-CM

## 2019-12-23 PROCEDURE — 85610 PROTHROMBIN TIME: CPT | Performed by: FAMILY MEDICINE

## 2019-12-23 PROCEDURE — 93793 ANTICOAG MGMT PT WARFARIN: CPT | Performed by: FAMILY MEDICINE

## 2019-12-23 NOTE — PROGRESS NOTES
Here for INR check in coumadin clinic in the office. CURRENT COUMADIN DOSE:  5mg M,W and 2.5mg daily the rest of the week     CHANGES OR COMPLAINTS:  Feeling better.  Previously had a URI and was taking cough med    INR RESULTS TODAY:  2.3 ( back in goal

## 2020-01-31 ENCOUNTER — ANTI-COAG VISIT (OUTPATIENT)
Dept: FAMILY MEDICINE CLINIC | Facility: CLINIC | Age: 60
End: 2020-01-31
Payer: MEDICAID

## 2020-01-31 DIAGNOSIS — Z79.01 LONG TERM (CURRENT) USE OF ANTICOAGULANTS: ICD-10-CM

## 2020-01-31 DIAGNOSIS — I48.91 ATRIAL FIBRILLATION, UNSPECIFIED TYPE (HCC): ICD-10-CM

## 2020-01-31 LAB — INR: 1.8 (ref 0.8–1.2)

## 2020-01-31 PROCEDURE — 85610 PROTHROMBIN TIME: CPT | Performed by: FAMILY MEDICINE

## 2020-01-31 PROCEDURE — 93793 ANTICOAG MGMT PT WARFARIN: CPT | Performed by: FAMILY MEDICINE

## 2020-01-31 RX ORDER — WARFARIN SODIUM 2.5 MG/1
TABLET ORAL
Qty: 90 TABLET | Refills: 1 | Status: SHIPPED | OUTPATIENT
Start: 2020-01-31 | End: 2020-06-16

## 2020-01-31 NOTE — TELEPHONE ENCOUNTER
Patient requesting updated script for coumadin sent to [facility]. Coumadin dose has increased. He is now taking coumadin 5mg F and 2.5mg daily the rest of the week. Future appt:     Your appointments     Date & Time Appointment Department [facility])    Feb 14

## 2020-01-31 NOTE — PROGRESS NOTES
Here for INR check in coumadin clinic in the office.     CURRENT COUMADIN DOSE:  5mg F and 2.5mg daily the rest of the week     CHANGES OR COMPLAINTS:  Ate two salads this week    INR RESULTS TODAY:  1.8 ( in goal)     PLAN:  Patient would like to continue

## 2020-02-03 ENCOUNTER — OFFICE VISIT (OUTPATIENT)
Dept: FAMILY MEDICINE CLINIC | Facility: CLINIC | Age: 60
End: 2020-02-03
Payer: MEDICAID

## 2020-02-03 VITALS
SYSTOLIC BLOOD PRESSURE: 130 MMHG | WEIGHT: 304.38 LBS | HEART RATE: 58 BPM | RESPIRATION RATE: 14 BRPM | TEMPERATURE: 98 F | BODY MASS INDEX: 43.57 KG/M2 | OXYGEN SATURATION: 95 % | HEIGHT: 70 IN | DIASTOLIC BLOOD PRESSURE: 82 MMHG

## 2020-02-03 DIAGNOSIS — G47.33 OBSTRUCTIVE SLEEP APNEA: Primary | ICD-10-CM

## 2020-02-03 DIAGNOSIS — E66.01 CLASS 3 SEVERE OBESITY DUE TO EXCESS CALORIES WITH SERIOUS COMORBIDITY AND BODY MASS INDEX (BMI) OF 40.0 TO 44.9 IN ADULT (HCC): ICD-10-CM

## 2020-02-03 PROCEDURE — 99214 OFFICE O/P EST MOD 30 MIN: CPT | Performed by: FAMILY MEDICINE

## 2020-02-04 DIAGNOSIS — L40.9 PSORIASIS: ICD-10-CM

## 2020-02-04 NOTE — PROGRESS NOTES
[facility] [place]  SLEEP PROGRESS NOTE        HPI:   This is a 56year old male coming in for Patient presents with:  Obstructive Sleep Apnea (KANG): Sleep FU      HPI: pt states he has some discomfort with the mask but he sleeps well with the m [id number]  No results found for: B12, VITB12      Past Medical History:   Diagnosis Date   • Psoriasis      Past Surgical History:   Procedure Laterality Date   • ANGIOGRAM  05/2014    1 stent      Social History:  Social History    Patient does not qualify SYSTEMS:   Review of Systems   Constitutional: Negative. HENT: Negative. Eyes: Negative. Respiratory: Positive for apnea. Cardiovascular: Negative. Neurological: Negative. Psychiatric/Behavioral: Positive for sleep disturbance.        EXAM He has no cervical adenopathy. Neurological: He is alert and oriented to person, place, and time. He has normal reflexes. Skin: Skin is warm and dry. Psychiatric: He has a normal mood and affect.  His behavior is normal. Judgment and thought harpreet have any questions regarding this note.  \"     Shun Steven MD  2/3/2020  7:33 PM

## 2020-02-05 NOTE — TELEPHONE ENCOUNTER
Future appt:     Your appointments     Date & Time Appointment Department [facility])    Feb 14, 2020  9:30 AM CST Anti-Coag with EM[facility] [address] [place] [address],[address], [place] ([place])            [place] [address]

## 2020-02-19 ENCOUNTER — TELEPHONE (OUTPATIENT)
Dept: FAMILY MEDICINE CLINIC | Facility: CLINIC | Age: 60
End: 2020-02-19

## 2020-02-19 NOTE — TELEPHONE ENCOUNTER
INR overdue on report. But looking at chart last INR done on 1/31/20   So his INR is not due until the end of the month. However patient does not have an upcoming appt scheduled.  Message left for patient reminding him that his INR is due March 1 and to

## 2020-03-10 RX ORDER — ENALAPRIL MALEATE 20 MG/1
TABLET ORAL
Qty: 60 TABLET | Refills: 1 | Status: SHIPPED | OUTPATIENT
Start: 2020-03-10 | End: 2020-06-16

## 2020-03-10 RX ORDER — DOXAZOSIN MESYLATE 4 MG/1
TABLET ORAL
Qty: 30 TABLET | Refills: 1 | Status: SHIPPED | OUTPATIENT
Start: 2020-03-10 | End: 2020-06-16

## 2020-03-10 RX ORDER — METOPROLOL TARTRATE 50 MG/1
TABLET, FILM COATED ORAL
Qty: 60 TABLET | Refills: 1 | Status: SHIPPED | OUTPATIENT
Start: 2020-03-10 | End: 2020-06-16

## 2020-03-10 NOTE — TELEPHONE ENCOUNTER
Future appt:    Last Appointment with provider:   11/12/2019  Last appointment at [facility]:  2/3/2020  Cholesterol, Total (mg/dL)   Date Value   11/08/2019 164     HDL Cholesterol (mg/dL)   Date Value   11/08/2019 36 (L)     LDL Cholesterol (mg/dL)   D

## 2020-03-30 ENCOUNTER — TELEPHONE (OUTPATIENT)
Dept: FAMILY MEDICINE CLINIC | Facility: CLINIC | Age: 60
End: 2020-03-30

## 2020-03-30 NOTE — TELEPHONE ENCOUNTER
Appt scheduled. Patient notified. Patient advised to call first before coming if any fever, sore throat or cough develope before appt tomorrow.

## 2020-03-30 NOTE — TELEPHONE ENCOUNTER
Patient was working in [employer] since February. He just returned home yesterday on 3/29/20. Denies any respiratory symptoms. Denies fever, cough, chills, sore throat, or shortness of breath. He states he feels well.      Denies any known exposure to a p

## 2020-03-30 NOTE — TELEPHONE ENCOUNTER
Patient just returned from [facility] yesterday 3/29 - Wants to come in for INR??    I didn't schedule due to him just returning, wasn't sure if I should?        Would like a call back

## 2020-03-31 ENCOUNTER — ANTI-COAG VISIT (OUTPATIENT)
Dept: FAMILY MEDICINE CLINIC | Facility: CLINIC | Age: 60
End: 2020-03-31
Payer: MEDICAID

## 2020-03-31 DIAGNOSIS — Z79.01 LONG TERM (CURRENT) USE OF ANTICOAGULANTS: ICD-10-CM

## 2020-03-31 DIAGNOSIS — I48.91 ATRIAL FIBRILLATION, UNSPECIFIED TYPE (HCC): ICD-10-CM

## 2020-03-31 LAB — INR: 2.5 (ref 0.8–1.2)

## 2020-03-31 PROCEDURE — 85610 PROTHROMBIN TIME: CPT | Performed by: FAMILY MEDICINE

## 2020-03-31 PROCEDURE — 93793 ANTICOAG MGMT PT WARFARIN: CPT | Performed by: FAMILY MEDICINE

## 2020-03-31 NOTE — PROGRESS NOTES
Here for INR check in coumadin clinic in the office. CURRENT COUMADIN DOSE:  5mg F and 2.5mg daily the rest of the week     CHANGES OR COMPLAINTS:  Eating less vegetables. Will be returning to a healthier diet with more vegetables.     INR RESULTS TODAY:

## 2020-04-03 RX ORDER — DILTIAZEM HYDROCHLORIDE 60 MG/1
TABLET, FILM COATED ORAL
Qty: 60 TABLET | Refills: 0 | Status: SHIPPED | OUTPATIENT
Start: 2020-04-03 | End: 2020-05-12

## 2020-04-03 NOTE — TELEPHONE ENCOUNTER
Future appt:     Your appointments     Date & Time Appointment Department [facility])    Apr 16, 2020 10:15 AM CDT Anti-Coag with EMG [address], [place] ([place] [address]

## 2020-04-16 ENCOUNTER — ANTI-COAG VISIT (OUTPATIENT)
Dept: FAMILY MEDICINE CLINIC | Facility: CLINIC | Age: 60
End: 2020-04-16
Payer: MEDICAID

## 2020-04-16 DIAGNOSIS — I48.91 ATRIAL FIBRILLATION, UNSPECIFIED TYPE (HCC): ICD-10-CM

## 2020-04-16 DIAGNOSIS — Z79.01 LONG TERM (CURRENT) USE OF ANTICOAGULANTS: ICD-10-CM

## 2020-04-16 PROCEDURE — 85610 PROTHROMBIN TIME: CPT | Performed by: FAMILY MEDICINE

## 2020-04-16 PROCEDURE — 93793 ANTICOAG MGMT PT WARFARIN: CPT | Performed by: FAMILY MEDICINE

## 2020-04-16 NOTE — PROGRESS NOTES
Here for INR check in coumadin clinic in the office.     CURRENT COUMADIN DOSE:  5mg F and 2.5mg daily the rest of the week    CHANGES OR COMPLAINTS:  No changes    INR RESULTS TODAY:  2.2 ( in goal)    PLAN:  CPM coumadin   Next INR due in one month  Appoi

## 2020-05-07 DIAGNOSIS — L40.9 PSORIASIS: ICD-10-CM

## 2020-05-07 NOTE — TELEPHONE ENCOUNTER
Future appt:     Your appointments     Date & Time Appointment Department [facility])    May 18, 2020 10:15 AM CDT Anti-Coag with EMG [address], [place] ([place] [address], [address]

## 2020-05-12 RX ORDER — DILTIAZEM HYDROCHLORIDE 60 MG/1
TABLET, FILM COATED ORAL
Qty: 60 TABLET | Refills: 0 | Status: SHIPPED | OUTPATIENT
Start: 2020-05-12 | End: 2020-06-16

## 2020-05-12 NOTE — TELEPHONE ENCOUNTER
Future appt:     Your appointments     Date & Time Appointment Department [facility])    May 18, 2020 10:15 AM CDT Anti-Coag with EMG [address], [place] ([place] [address], [address]

## 2020-06-12 DIAGNOSIS — L40.9 PSORIASIS: ICD-10-CM

## 2020-06-12 RX ORDER — DILTIAZEM HYDROCHLORIDE 60 MG/1
TABLET, FILM COATED ORAL
Qty: 60 TABLET | Refills: 0 | OUTPATIENT
Start: 2020-06-12

## 2020-06-12 RX ORDER — METOPROLOL TARTRATE 50 MG/1
TABLET, FILM COATED ORAL
Qty: 60 TABLET | Refills: 1 | OUTPATIENT
Start: 2020-06-12

## 2020-06-12 RX ORDER — ENALAPRIL MALEATE 20 MG/1
TABLET ORAL
Qty: 60 TABLET | Refills: 1 | OUTPATIENT
Start: 2020-06-12

## 2020-06-12 NOTE — TELEPHONE ENCOUNTER
Future appt:     Last Appointment with provider:  11/12/2019; Return in about 6 months (around 5/12/2020) for follow up.      [facility]  [facility]  Cholesterol, Total (mg/dL)   Date Value   11/08/2019 164     HDL Cholesterol (mg/dL)

## 2020-06-12 NOTE — TELEPHONE ENCOUNTER
Spoke with patient. Appt scheduled. Only RF he is asking for right now is the Triamcinolone cream.  States he has RFs on the other medications. Please advise.      Future Appointments   Date Time Provider Kimberly Zhu   6/20/2020 10:30 AM Bill [place]

## 2020-06-15 ENCOUNTER — TELEPHONE (OUTPATIENT)
Dept: FAMILY MEDICINE CLINIC | Facility: CLINIC | Age: 60
End: 2020-06-15

## 2020-06-15 ENCOUNTER — ANTI-COAG VISIT (OUTPATIENT)
Dept: FAMILY MEDICINE CLINIC | Facility: CLINIC | Age: 60
End: 2020-06-15
Payer: MEDICAID

## 2020-06-15 VITALS
DIASTOLIC BLOOD PRESSURE: 90 MMHG | OXYGEN SATURATION: 98 % | WEIGHT: 311 LBS | SYSTOLIC BLOOD PRESSURE: 144 MMHG | RESPIRATION RATE: 20 BRPM | TEMPERATURE: 98 F | HEIGHT: 70 IN | BODY MASS INDEX: 44.52 KG/M2 | HEART RATE: 82 BPM

## 2020-06-15 DIAGNOSIS — I48.91 ATRIAL FIBRILLATION, UNSPECIFIED TYPE (HCC): ICD-10-CM

## 2020-06-15 DIAGNOSIS — Z79.01 LONG TERM (CURRENT) USE OF ANTICOAGULANTS: ICD-10-CM

## 2020-06-15 PROCEDURE — 93793 ANTICOAG MGMT PT WARFARIN: CPT | Performed by: FAMILY MEDICINE

## 2020-06-15 NOTE — TELEPHONE ENCOUNTER
Patient is needing medication refill     Needing all medications refilled     Sent to [facility] in [place]

## 2020-06-15 NOTE — PROGRESS NOTES
Here for INR check in coumadin clinic in the office. CURRENT COUMADIN DOSE:  5mg F and 2.5mg daily the rest of the week     CHANGES OR COMPLAINTS:  No changes    INR RESULTS TODAY:  2.7 ( in goal)    PLAN:  CPM coumadin   Next INR in one month.  Appointm

## 2020-06-16 ENCOUNTER — TELEPHONE (OUTPATIENT)
Dept: FAMILY MEDICINE CLINIC | Facility: CLINIC | Age: 60
End: 2020-06-16

## 2020-06-16 DIAGNOSIS — I48.91 ATRIAL FIBRILLATION, UNSPECIFIED TYPE (HCC): ICD-10-CM

## 2020-06-16 RX ORDER — ENALAPRIL MALEATE 20 MG/1
20 TABLET ORAL 2 TIMES DAILY
Qty: 60 TABLET | Refills: 0 | Status: SHIPPED | OUTPATIENT
Start: 2020-06-16 | End: 2020-06-20

## 2020-06-16 RX ORDER — DILTIAZEM HYDROCHLORIDE 60 MG/1
60 TABLET, FILM COATED ORAL 2 TIMES DAILY
Qty: 60 TABLET | Refills: 0 | Status: SHIPPED | OUTPATIENT
Start: 2020-06-16 | End: 2020-06-20

## 2020-06-16 RX ORDER — DOXAZOSIN MESYLATE 4 MG/1
4 TABLET ORAL DAILY
Qty: 30 TABLET | Refills: 0 | Status: SHIPPED | OUTPATIENT
Start: 2020-06-16 | End: 2020-06-20

## 2020-06-16 RX ORDER — METOPROLOL TARTRATE 50 MG/1
50 TABLET, FILM COATED ORAL 2 TIMES DAILY
Qty: 60 TABLET | Refills: 0 | Status: SHIPPED | OUTPATIENT
Start: 2020-06-16 | End: 2020-06-20

## 2020-06-16 RX ORDER — WARFARIN SODIUM 2.5 MG/1
TABLET ORAL
Qty: 90 TABLET | Refills: 1 | Status: SHIPPED | OUTPATIENT
Start: 2020-06-16 | End: 2020-06-20

## 2020-06-16 NOTE — TELEPHONE ENCOUNTER
Patient called in again wanting to speak to Nurse or MD regarding his medications that have not been filled yet    States he has blood pressure medications and cannot wait any longer     Patient already spoke to his pharmacy and they do not have his medica

## 2020-06-16 NOTE — TELEPHONE ENCOUNTER
Patient states that hes completely out of medications at this time. Patient states that he has appt on Saturday but is out of medications now. Dr Roxann Romo can you please advise.

## 2020-06-20 ENCOUNTER — OFFICE VISIT (OUTPATIENT)
Dept: FAMILY MEDICINE CLINIC | Facility: CLINIC | Age: 60
End: 2020-06-20
Payer: MEDICAID

## 2020-06-20 VITALS
WEIGHT: 312 LBS | HEIGHT: 70 IN | TEMPERATURE: 98 F | RESPIRATION RATE: 18 BRPM | SYSTOLIC BLOOD PRESSURE: 134 MMHG | BODY MASS INDEX: 44.67 KG/M2 | DIASTOLIC BLOOD PRESSURE: 88 MMHG | HEART RATE: 94 BPM

## 2020-06-20 DIAGNOSIS — I48.91 ATRIAL FIBRILLATION, UNSPECIFIED TYPE (HCC): ICD-10-CM

## 2020-06-20 DIAGNOSIS — E66.01 CLASS 3 SEVERE OBESITY DUE TO EXCESS CALORIES WITH SERIOUS COMORBIDITY AND BODY MASS INDEX (BMI) OF 40.0 TO 44.9 IN ADULT (HCC): ICD-10-CM

## 2020-06-20 DIAGNOSIS — I10 ESSENTIAL HYPERTENSION: Primary | ICD-10-CM

## 2020-06-20 DIAGNOSIS — I48.11 LONGSTANDING PERSISTENT ATRIAL FIBRILLATION (HCC): ICD-10-CM

## 2020-06-20 DIAGNOSIS — R73.03 PREDIABETES: ICD-10-CM

## 2020-06-20 PROCEDURE — 83036 HEMOGLOBIN GLYCOSYLATED A1C: CPT | Performed by: FAMILY MEDICINE

## 2020-06-20 PROCEDURE — 80053 COMPREHEN METABOLIC PANEL: CPT | Performed by: FAMILY MEDICINE

## 2020-06-20 PROCEDURE — 99214 OFFICE O/P EST MOD 30 MIN: CPT | Performed by: FAMILY MEDICINE

## 2020-06-20 RX ORDER — DOXAZOSIN MESYLATE 4 MG/1
4 TABLET ORAL DAILY
Qty: 30 TABLET | Refills: 5 | Status: SHIPPED | OUTPATIENT
Start: 2020-06-20 | End: 2021-03-18

## 2020-06-20 RX ORDER — ENALAPRIL MALEATE 20 MG/1
20 TABLET ORAL 2 TIMES DAILY
Qty: 60 TABLET | Refills: 5 | Status: SHIPPED | OUTPATIENT
Start: 2020-06-20 | End: 2021-03-18

## 2020-06-20 RX ORDER — METOPROLOL TARTRATE 50 MG/1
50 TABLET, FILM COATED ORAL 2 TIMES DAILY
Qty: 60 TABLET | Refills: 5 | Status: SHIPPED | OUTPATIENT
Start: 2020-06-20 | End: 2021-03-18

## 2020-06-20 RX ORDER — DILTIAZEM HYDROCHLORIDE 60 MG/1
60 TABLET, FILM COATED ORAL 2 TIMES DAILY
Qty: 60 TABLET | Refills: 5 | Status: SHIPPED | OUTPATIENT
Start: 2020-06-20 | End: 2021-01-08

## 2020-06-20 RX ORDER — WARFARIN SODIUM 2.5 MG/1
TABLET ORAL
Qty: 90 TABLET | Refills: 5 | Status: SHIPPED | OUTPATIENT
Start: 2020-06-20 | End: 2021-03-18

## 2020-06-20 NOTE — PATIENT INSTRUCTIONS
Continue current medications  Blood pressure stable today. Advice low salt diet and exercise. Monitor your blood pressure. Return to clinic if systolic blood pressure more than 342 or diastolic more than 995.   Advice low carb in diet, AVOID FOOD WITH HIG

## 2020-06-20 NOTE — PROGRESS NOTES
[facility] [place]  PROGRESS NOTE  Chief Complaint:   Patient presents with:  Medication Follow-Up  Hypertension: 120s-140s      HPI:   This is a 56year old male with history of hypertension, atrial fibrillation, prediabetes and obesity present Fridays and one tablet (2.5mg) daily the rest of the week 90 tablet 5   • TRIAMCINOLONE ACETONIDE 0.1 % External Cream APPLY TOPICALLY TO AFFECTED AREA(S) TWICE A DAY 80 g 0   • aspirin (ASPIRIN LOW DOSE) 81 MG Oral Tab EC 1 tab daily        Counseling giv murmurs, rubs or gallops. EXTREMITIES: No edema, no cyanosis, no clubbing, FROM, 2+ dorsalis pedis pulses bilaterally. ABDOMEN: Soft, nondistended, obese, nontender, bowel sounds normal in all 4 quadrants, no Masses, no hepatosplenomegaly.   SKIN: No rash no juice or soda, don't eat late at night, exercise,  and weight loss. Check labs today.            Health Maintenance:  Pneumococcal PPSV23 Medium Risk Adult(1 of 1 - PPSV23) due on 01/16/1977  FIT Colorectal Screening due on 01/16/2008  Colonoscopy due

## 2020-07-16 ENCOUNTER — TELEPHONE (OUTPATIENT)
Dept: FAMILY MEDICINE CLINIC | Facility: CLINIC | Age: 60
End: 2020-07-16

## 2020-07-16 NOTE — TELEPHONE ENCOUNTER
Let pt know the following below. Pt verbalized his understanding and had no other questions at this time.    Future Appointments   Date Time Provider Kimberly Zhu   7/20/2020 10:30 AM [facility] COUMADIN NURSE [facility] [place] [facility] [place]   7/20/2020  6:20 PM Mo

## 2020-07-16 NOTE — TELEPHONE ENCOUNTER
pt has question about a blister on his leg- it did finally open and bled for a while, wants to know what he should do now since it filled up with blood again and he's afraid it will get worse

## 2020-07-16 NOTE — TELEPHONE ENCOUNTER
Patient is calling stating that he has a blister on the back of his leg in the middle of his calf and ankle (which was discussed at 6/20/20 appt) and it broke open on Tuesday and trickled blood for about an hour.  Patient states that he was sitting an talki

## 2020-07-20 ENCOUNTER — OFFICE VISIT (OUTPATIENT)
Dept: FAMILY MEDICINE CLINIC | Facility: CLINIC | Age: 60
End: 2020-07-20
Payer: MEDICAID

## 2020-07-20 ENCOUNTER — ANTI-COAG VISIT (OUTPATIENT)
Dept: FAMILY MEDICINE CLINIC | Facility: CLINIC | Age: 60
End: 2020-07-20
Payer: MEDICAID

## 2020-07-20 VITALS
BODY MASS INDEX: 42.66 KG/M2 | RESPIRATION RATE: 18 BRPM | SYSTOLIC BLOOD PRESSURE: 134 MMHG | WEIGHT: 298 LBS | HEIGHT: 70 IN | DIASTOLIC BLOOD PRESSURE: 82 MMHG | TEMPERATURE: 98 F | HEART RATE: 83 BPM

## 2020-07-20 DIAGNOSIS — Z79.01 LONG TERM (CURRENT) USE OF ANTICOAGULANTS: ICD-10-CM

## 2020-07-20 DIAGNOSIS — T14.8XXA BLISTER: Primary | ICD-10-CM

## 2020-07-20 DIAGNOSIS — I48.11 LONGSTANDING PERSISTENT ATRIAL FIBRILLATION (HCC): ICD-10-CM

## 2020-07-20 DIAGNOSIS — I10 ESSENTIAL HYPERTENSION: ICD-10-CM

## 2020-07-20 DIAGNOSIS — M25.50 MULTIPLE JOINT PAIN: ICD-10-CM

## 2020-07-20 DIAGNOSIS — L40.9 PSORIASIS: ICD-10-CM

## 2020-07-20 LAB — INR: 2.3 (ref 0.8–1.2)

## 2020-07-20 PROCEDURE — 3075F SYST BP GE 130 - 139MM HG: CPT | Performed by: FAMILY MEDICINE

## 2020-07-20 PROCEDURE — 99214 OFFICE O/P EST MOD 30 MIN: CPT | Performed by: FAMILY MEDICINE

## 2020-07-20 PROCEDURE — 3008F BODY MASS INDEX DOCD: CPT | Performed by: FAMILY MEDICINE

## 2020-07-20 PROCEDURE — 3079F DIAST BP 80-89 MM HG: CPT | Performed by: FAMILY MEDICINE

## 2020-07-20 PROCEDURE — 85610 PROTHROMBIN TIME: CPT | Performed by: FAMILY MEDICINE

## 2020-07-20 RX ORDER — CLOBETASOL PROPIONATE 0.5 MG/G
OINTMENT TOPICAL
Qty: 120 G | Refills: 2 | Status: SHIPPED | OUTPATIENT
Start: 2020-07-20 | End: 2021-03-18

## 2020-07-20 NOTE — PATIENT INSTRUCTIONS
Monitor blister, use bandage. See surgeon for further evaluation. Hold aspirin for 1-2 weeks. See rheumatologist due to psoriasis and multiple joint pain. Use ointment as needed   Return to clinic if any concern.

## 2020-07-20 NOTE — PROGRESS NOTES
Here for INR check in coumadin clinic in the office. Also appointment with Dr. Andres Mosqueda later today.      CURRENT COUMADIN DOSE:  5mg F and 2.5mg daily the rest of the week    CHANGES OR COMPLAINTS:  Blood blister on leg due to trauma- appt with MD today

## 2020-07-21 NOTE — PROGRESS NOTES
[facility] [place]  PROGRESS NOTE  Chief Complaint:   Patient presents with:  Blisters: left leg      HPI:   This is a 56year old male with a history of psoriasis, hypertension presents for follow-up and evaluation of blister on his left calf. MG Oral Tab Take 1 tablet (4 mg total) by mouth daily. 30 tablet 5   • dilTIAZem HCl 60 MG Oral Tab Take 1 tablet (60 mg total) by mouth 2 (two) times daily.  60 tablet 5   • Warfarin Sodium 2.5 MG Oral Tab Two tablets (5mg) by mouth on Fridays and one tabl over left calf region, nontender, no drainage present currently. MUSCULOSKELETAL: Normal ROM, no joint pain, or muscle weakness in all extremity. NEUROLOGICAL:  No deficit, normal gait, strength and tone, normal reflexes.   PSYCHIATRIC: Alert and oriente anabela Forrest MD    This note was created utilizing Dragon speech recognition software.  Please excuse any grammatical errors. Call my office if you have any questions regarding this note.

## 2020-07-29 ENCOUNTER — TELEPHONE (OUTPATIENT)
Dept: FAMILY MEDICINE CLINIC | Facility: CLINIC | Age: 60
End: 2020-07-29

## 2020-07-29 NOTE — TELEPHONE ENCOUNTER
please resend referral to specialist Surgeon     fax# [phone]  -   they have not received anything as of yet     attn: Dr Sonia Montes   ///  call back to confirm      Future Appointments   Date Time Provider Kimberly Zhu   8/18/2020  9:30 AM E

## 2020-07-29 NOTE — TELEPHONE ENCOUNTER
See scanned report.   Non-chargeable visit refaxed referral and received conformation. Patient informed. No other questions at this time.

## 2020-08-18 ENCOUNTER — ANTI-COAG VISIT (OUTPATIENT)
Dept: FAMILY MEDICINE CLINIC | Facility: CLINIC | Age: 60
End: 2020-08-18
Payer: MEDICAID

## 2020-08-18 VITALS — TEMPERATURE: 98 F

## 2020-08-18 DIAGNOSIS — Z79.01 LONG TERM (CURRENT) USE OF ANTICOAGULANTS: ICD-10-CM

## 2020-08-18 DIAGNOSIS — I48.11 LONGSTANDING PERSISTENT ATRIAL FIBRILLATION (HCC): ICD-10-CM

## 2020-08-18 LAB — INR: 2.5 (ref 0.8–1.2)

## 2020-08-18 PROCEDURE — 85610 PROTHROMBIN TIME: CPT | Performed by: FAMILY MEDICINE

## 2020-08-18 PROCEDURE — 93793 ANTICOAG MGMT PT WARFARIN: CPT | Performed by: FAMILY MEDICINE

## 2020-08-19 NOTE — PROGRESS NOTES
Here for INR check in coumadin clinic in the office.     CURRENT COUMADIN DOSE:  5mg F and 2.5mg daily the rest of the week     CHANGES OR COMPLAINTS:  No changes    INR RESULTS TODAY:  2.5 ( in goal)    PLAN:  CPM coumadin   Next INR due in one month  Appo

## 2020-09-22 ENCOUNTER — ANTI-COAG VISIT (OUTPATIENT)
Dept: FAMILY MEDICINE CLINIC | Facility: CLINIC | Age: 60
End: 2020-09-22
Payer: MEDICAID

## 2020-09-22 DIAGNOSIS — I48.11 LONGSTANDING PERSISTENT ATRIAL FIBRILLATION (HCC): ICD-10-CM

## 2020-09-22 DIAGNOSIS — Z79.01 LONG TERM (CURRENT) USE OF ANTICOAGULANTS: ICD-10-CM

## 2020-09-22 LAB — INR: 3 (ref 0.8–1.2)

## 2020-09-22 PROCEDURE — 93793 ANTICOAG MGMT PT WARFARIN: CPT | Performed by: FAMILY MEDICINE

## 2020-09-22 PROCEDURE — 85610 PROTHROMBIN TIME: CPT | Performed by: FAMILY MEDICINE

## 2020-09-22 NOTE — PROGRESS NOTES
Here for INR check in coumadin clinic in the office.     CURRENT COUMADIN DOSE:  5mg F and 2.5mg daily the rest of the week     CHANGES OR COMPLAINTS:  Ate less salads/vegetables    INR RESULTS TODAY:  3.0    PLAN:  CPM coumadin   Next INR in one month  Res

## 2020-10-20 ENCOUNTER — ANTI-COAG VISIT (OUTPATIENT)
Dept: FAMILY MEDICINE CLINIC | Facility: CLINIC | Age: 60
End: 2020-10-20
Payer: MEDICAID

## 2020-10-20 DIAGNOSIS — Z79.01 LONG TERM (CURRENT) USE OF ANTICOAGULANTS: ICD-10-CM

## 2020-10-20 DIAGNOSIS — I48.11 LONGSTANDING PERSISTENT ATRIAL FIBRILLATION (HCC): ICD-10-CM

## 2020-10-20 PROCEDURE — 85610 PROTHROMBIN TIME: CPT | Performed by: FAMILY MEDICINE

## 2020-10-20 PROCEDURE — 93793 ANTICOAG MGMT PT WARFARIN: CPT | Performed by: FAMILY MEDICINE

## 2020-10-20 NOTE — PROGRESS NOTES
Here for INR check in coumadin clinic in the office.     CURRENT COUMADIN DOSE:  5mg F and 2.5mg daily the rest of the week     CHANGES OR COMPLAINTS:  Not eating much salads    INR RESULTS TODAY:  3.0 ( in goal, stable)    PLAN:  CPM coumadin   Discussed d

## 2020-11-13 ENCOUNTER — TELEPHONE (OUTPATIENT)
Dept: FAMILY MEDICINE CLINIC | Facility: CLINIC | Age: 60
End: 2020-11-13

## 2020-11-13 ENCOUNTER — VIRTUAL PHONE E/M (OUTPATIENT)
Dept: FAMILY MEDICINE CLINIC | Facility: CLINIC | Age: 60
End: 2020-11-13
Payer: MEDICAID

## 2020-11-13 DIAGNOSIS — R50.9 FEVER, UNSPECIFIED FEVER CAUSE: ICD-10-CM

## 2020-11-13 DIAGNOSIS — R06.02 SHORTNESS OF BREATH: ICD-10-CM

## 2020-11-13 DIAGNOSIS — R50.9 FEVER IN ADULT: ICD-10-CM

## 2020-11-13 DIAGNOSIS — Z20.822 CLOSE EXPOSURE TO COVID-19 VIRUS: Primary | ICD-10-CM

## 2020-11-13 DIAGNOSIS — Z20.822 EXPOSURE TO 2019 NOVEL CORONAVIRUS: ICD-10-CM

## 2020-11-13 DIAGNOSIS — R05.9 COUGH: ICD-10-CM

## 2020-11-13 DIAGNOSIS — R06.02 SOB (SHORTNESS OF BREATH): ICD-10-CM

## 2020-11-13 PROCEDURE — 99213 OFFICE O/P EST LOW 20 MIN: CPT | Performed by: NURSE PRACTITIONER

## 2020-11-13 RX ORDER — AZITHROMYCIN 250 MG/1
TABLET, FILM COATED ORAL
Qty: 6 TABLET | Refills: 0 | Status: SHIPPED | OUTPATIENT
Start: 2020-11-13 | End: 2020-11-18

## 2020-11-13 NOTE — TELEPHONE ENCOUNTER
Patient states he was exposed to someone with Covid. C/o cough, fever, runny nose, and fatigue. Appt scheduled. Patient unable to do a video visit.      Future Appointments   Date Time Provider Kimberly Zhu   11/13/2020  2:30 PM Evert [place]

## 2020-11-16 ENCOUNTER — TELEPHONE (OUTPATIENT)
Dept: FAMILY MEDICINE CLINIC | Facility: CLINIC | Age: 60
End: 2020-11-16

## 2020-11-16 NOTE — PROGRESS NOTES
Telephone Check-In    Osiel Morris verbally consents to a Virtual/Telephone Check-In service on 11/15/20. Patient understands and accepts financial responsibility for any deductible, co-insurance and/or co-pays associated with this service.     Duration understands phone evaluation is not a substitute for face-to-face examination or emergency care. Patient advised to go to ER or call 911 for worsening symptoms or acute distress. Patient Instructions   Directed to take antibiotics until gone.   Recommen for at least 20 seconds or clean your hands with an alcoholbased hand  that contains at least 60% alcohol. 8. As much as possible, stay in a specific room and away from other people in your home.  Also, you should use a separate bathroom, if avai [employer]. pdf  [employer]cy  http://www.[facility].[place].gov/topics-services/diseases-and-conditions/dise

## 2020-11-16 NOTE — TELEPHONE ENCOUNTER
Spoke with patient. Covid results are not in the computer yet.  Informed patient we would call with results

## 2020-11-16 NOTE — PATIENT INSTRUCTIONS
Directed to take antibiotics until gone. Recommend to eat with  the antibiotic. Treat symptoms as needed. Get Covid testing    If shortness of breath increases, must go to the ER.          Coronavirus Disease 2019 (COVID-19)     Bogdan Mead Also, you should use a separate bathroom, if available. If you need to be around other people in or outside of the home, wear a facemask. 9. Avoid sharing personal items with other people in your household, like dishes, towels, and bedding   10.  Clean al

## 2020-11-17 LAB — SARS COV2 COVID19 NAAT: DETECTED

## 2020-11-17 NOTE — TELEPHONE ENCOUNTER
Covid test 11/14/20- Detected. Symptoms- cough, fatigued. No fever. Informed patient.  No questions at this time

## 2020-12-01 ENCOUNTER — TELEPHONE (OUTPATIENT)
Dept: FAMILY MEDICINE CLINIC | Facility: CLINIC | Age: 60
End: 2020-12-01

## 2020-12-01 NOTE — TELEPHONE ENCOUNTER
answered YES to COVID question //  + exposure in last 30 days - himself - he was + 11/14.    has appt 12/2        Future Appointments   Date Time Provider Kimberly Zhu   12/2/2020 11:45 AM [facility] COUMAD[place] NURSE [facility] [place] [facility] [place]

## 2020-12-01 NOTE — TELEPHONE ENCOUNTER
Patient has been cleared by [facility]. Quarantine over. Ok to keep appt tomorrow? Please advise. Carli Kelley, 12/01/20, 9:54 AM    Future appt:     Your appointments     Date & Time Appointment Department [facility])    Dec 02, 2020 11:45 AM CST Anticoagulati

## 2020-12-02 ENCOUNTER — ANTI-COAG VISIT (OUTPATIENT)
Dept: FAMILY MEDICINE CLINIC | Facility: CLINIC | Age: 60
End: 2020-12-02
Payer: MEDICAID

## 2020-12-02 DIAGNOSIS — Z79.01 LONG TERM (CURRENT) USE OF ANTICOAGULANTS: ICD-10-CM

## 2020-12-02 PROCEDURE — 85610 PROTHROMBIN TIME: CPT | Performed by: FAMILY MEDICINE

## 2020-12-02 PROCEDURE — 93793 ANTICOAG MGMT PT WARFARIN: CPT | Performed by: FAMILY MEDICINE

## 2020-12-02 NOTE — PROGRESS NOTES
Here for INR check in coumadin clinic in the office.     CURRENT COUMADIN DOSE:  5mg F and 2.5mg daily the rest of the week     CHANGES OR COMPLAINTS:  Diagnosed with Covid on 11/14/20  Completed antibiotic  Not eating any salads or vegetables    INR RESULT

## 2020-12-08 ENCOUNTER — ANTI-COAG VISIT (OUTPATIENT)
Dept: FAMILY MEDICINE CLINIC | Facility: CLINIC | Age: 60
End: 2020-12-08
Payer: MEDICAID

## 2020-12-08 DIAGNOSIS — Z79.01 LONG TERM (CURRENT) USE OF ANTICOAGULANTS: ICD-10-CM

## 2020-12-08 DIAGNOSIS — I48.91 ATRIAL FIBRILLATION, UNSPECIFIED TYPE (HCC): ICD-10-CM

## 2020-12-08 PROCEDURE — 93793 ANTICOAG MGMT PT WARFARIN: CPT | Performed by: FAMILY MEDICINE

## 2020-12-08 PROCEDURE — 85610 PROTHROMBIN TIME: CPT | Performed by: FAMILY MEDICINE

## 2020-12-08 NOTE — PROGRESS NOTES
Pt notified and verbalized understanding. He states that he will be out of state next and would like to know why he can't recheck INR in 1 month like he used to do. Pt would like message sent to Dr. Gillian Stevens for tomorrow.

## 2020-12-08 NOTE — PROGRESS NOTES
Patient is here for in office INR check. Patient findings all negative. INR today is 2.2 - in goal range for patient. Confirmed that previous coumadin instructions given on 12/2 were followed as such. Please advise coumadin instructions.

## 2021-01-06 ENCOUNTER — TELEPHONE (OUTPATIENT)
Dept: FAMILY MEDICINE CLINIC | Facility: CLINIC | Age: 61
End: 2021-01-06

## 2021-01-08 RX ORDER — DILTIAZEM HYDROCHLORIDE 60 MG/1
60 TABLET, FILM COATED ORAL 2 TIMES DAILY
Qty: 60 TABLET | Refills: 0 | Status: SHIPPED | OUTPATIENT
Start: 2021-01-08 | End: 2021-02-16

## 2021-01-08 NOTE — TELEPHONE ENCOUNTER
Future appt:    Last Appointment with provider:   7/20/2020  Last appointment at [facility] [place]:  7/20/2020    Pt currently at pharmacy in [place]. Pt aware that he is due for his px. Pt states he will call back to get scheduled.   Pt plans to be back in town

## 2021-02-15 ENCOUNTER — TELEPHONE (OUTPATIENT)
Dept: FAMILY MEDICINE CLINIC | Facility: CLINIC | Age: 61
End: 2021-02-15

## 2021-02-15 NOTE — TELEPHONE ENCOUNTER
Need refill of diltiazem sent to [facility] in [place], [place].

## 2021-02-15 NOTE — TELEPHONE ENCOUNTER
Future appt:    Last Appointment with provider:  7/20/2020; No f/u recommended    Last appointment at [facility]:  Visit date not found  Cholesterol, Total (mg/dL)   Date Value   11/08/2019 164     HDL Cholesterol (mg/dL)   Date Value   11/08/2019 36 (L)

## 2021-02-16 RX ORDER — DILTIAZEM HYDROCHLORIDE 60 MG/1
60 TABLET, FILM COATED ORAL 2 TIMES DAILY
Qty: 60 TABLET | Refills: 0 | Status: SHIPPED | OUTPATIENT
Start: 2021-02-16 | End: 2021-03-18

## 2021-02-16 NOTE — TELEPHONE ENCOUNTER
Please inform patient that prescription is sent. Also remind patient that he is due for his annual exam, recommend to schedule as soon as possible.

## 2021-02-16 NOTE — TELEPHONE ENCOUNTER
Patient notified of the below results and recommendations and expressed understanding. Patient is in [place] until March and will call the office back about a week prior to schedule.

## 2021-02-22 ENCOUNTER — TELEPHONE (OUTPATIENT)
Dept: FAMILY MEDICINE CLINIC | Facility: CLINIC | Age: 61
End: 2021-02-22

## 2021-02-24 ENCOUNTER — TELEPHONE (OUTPATIENT)
Dept: FAMILY MEDICINE CLINIC | Facility: CLINIC | Age: 61
End: 2021-02-24

## 2021-03-16 PROBLEM — I21.9 MYOCARDIAL INFARCTION (HCC): Status: ACTIVE | Noted: 2021-03-16

## 2021-03-16 PROBLEM — I10 HYPERTENSION: Status: ACTIVE | Noted: 2021-03-16

## 2021-03-16 PROBLEM — E66.9 OBESITY: Status: ACTIVE | Noted: 2021-03-16

## 2021-03-16 PROBLEM — E78.5 HYPERLIPEMIA: Status: ACTIVE | Noted: 2021-03-16

## 2021-03-17 ENCOUNTER — TELEPHONE (OUTPATIENT)
Dept: FAMILY MEDICINE CLINIC | Facility: CLINIC | Age: 61
End: 2021-03-17

## 2021-03-17 DIAGNOSIS — Z79.01 ENCOUNTER FOR CURRENT LONG-TERM USE OF ANTICOAGULANTS: Primary | ICD-10-CM

## 2021-03-17 NOTE — TELEPHONE ENCOUNTER
Patient having labs and appt with Dr. David Benton tomorrow. Due for INR. Will add order for INR to blood work.

## 2021-03-18 ENCOUNTER — LAB ENCOUNTER (OUTPATIENT)
Dept: LAB | Age: 61
End: 2021-03-18
Attending: FAMILY MEDICINE
Payer: MEDICAID

## 2021-03-18 ENCOUNTER — OFFICE VISIT (OUTPATIENT)
Dept: FAMILY MEDICINE CLINIC | Facility: CLINIC | Age: 61
End: 2021-03-18
Payer: MEDICAID

## 2021-03-18 VITALS
HEIGHT: 70 IN | DIASTOLIC BLOOD PRESSURE: 80 MMHG | HEART RATE: 91 BPM | RESPIRATION RATE: 18 BRPM | SYSTOLIC BLOOD PRESSURE: 136 MMHG | WEIGHT: 315 LBS | TEMPERATURE: 97 F | BODY MASS INDEX: 45.1 KG/M2 | OXYGEN SATURATION: 96 %

## 2021-03-18 DIAGNOSIS — M19.90 ARTHRITIS: ICD-10-CM

## 2021-03-18 DIAGNOSIS — Z79.01 ENCOUNTER FOR CURRENT LONG-TERM USE OF ANTICOAGULANTS: ICD-10-CM

## 2021-03-18 DIAGNOSIS — E66.01 CLASS 3 SEVERE OBESITY DUE TO EXCESS CALORIES WITHOUT SERIOUS COMORBIDITY WITH BODY MASS INDEX (BMI) OF 45.0 TO 49.9 IN ADULT (HCC): ICD-10-CM

## 2021-03-18 DIAGNOSIS — L40.9 PSORIASIS: ICD-10-CM

## 2021-03-18 DIAGNOSIS — Z12.11 COLON CANCER SCREENING: ICD-10-CM

## 2021-03-18 DIAGNOSIS — Z00.00 PHYSICAL EXAM: ICD-10-CM

## 2021-03-18 DIAGNOSIS — I48.91 ATRIAL FIBRILLATION, UNSPECIFIED TYPE (HCC): ICD-10-CM

## 2021-03-18 DIAGNOSIS — I10 ESSENTIAL HYPERTENSION: ICD-10-CM

## 2021-03-18 DIAGNOSIS — Z00.00 PHYSICAL EXAM: Primary | ICD-10-CM

## 2021-03-18 LAB
ALBUMIN SERPL-MCNC: 3.7 G/DL (ref 3.4–5)
ALBUMIN/GLOB SERPL: 1 {RATIO} (ref 1–2)
ALP LIVER SERPL-CCNC: 61 U/L
ALT SERPL-CCNC: 29 U/L
ANION GAP SERPL CALC-SCNC: 5 MMOL/L (ref 0–18)
AST SERPL-CCNC: 21 U/L (ref 15–37)
BASOPHILS # BLD AUTO: 0.05 X10(3) UL (ref 0–0.2)
BASOPHILS NFR BLD AUTO: 0.6 %
BILIRUB SERPL-MCNC: 1 MG/DL (ref 0.1–2)
BILIRUB UR QL STRIP.AUTO: NEGATIVE
BUN BLD-MCNC: 14 MG/DL (ref 7–18)
BUN/CREAT SERPL: 13.7 (ref 10–20)
CALCIUM BLD-MCNC: 8.9 MG/DL (ref 8.5–10.1)
CHLORIDE SERPL-SCNC: 106 MMOL/L (ref 98–112)
CHOLEST SMN-MCNC: 168 MG/DL (ref ?–200)
CLARITY UR REFRACT.AUTO: CLEAR
CO2 SERPL-SCNC: 27 MMOL/L (ref 21–32)
COLOR UR AUTO: YELLOW
COMPLEXED PSA SERPL-MCNC: 0.54 NG/ML (ref ?–4)
CREAT BLD-MCNC: 1.02 MG/DL
DEPRECATED RDW RBC AUTO: 51.4 FL (ref 35.1–46.3)
EOSINOPHIL # BLD AUTO: 0.21 X10(3) UL (ref 0–0.7)
EOSINOPHIL NFR BLD AUTO: 2.6 %
ERYTHROCYTE [DISTWIDTH] IN BLOOD BY AUTOMATED COUNT: 15.1 % (ref 11–15)
EST. AVERAGE GLUCOSE BLD GHB EST-MCNC: 123 MG/DL (ref 68–126)
GLOBULIN PLAS-MCNC: 3.8 G/DL (ref 2.8–4.4)
GLUCOSE BLD-MCNC: 88 MG/DL (ref 70–99)
GLUCOSE UR STRIP.AUTO-MCNC: NEGATIVE MG/DL
HBA1C MFR BLD HPLC: 5.9 % (ref ?–5.7)
HCT VFR BLD AUTO: 46.7 %
HDLC SERPL-MCNC: 36 MG/DL (ref 40–59)
HGB BLD-MCNC: 15.3 G/DL
IMM GRANULOCYTES # BLD AUTO: 0.01 X10(3) UL (ref 0–1)
IMM GRANULOCYTES NFR BLD: 0.1 %
INR BLD: 2 (ref 0.89–1.11)
KETONES UR STRIP.AUTO-MCNC: NEGATIVE MG/DL
LDLC SERPL CALC-MCNC: 115 MG/DL (ref ?–100)
LEUKOCYTE ESTERASE UR QL STRIP.AUTO: NEGATIVE
LYMPHOCYTES # BLD AUTO: 1.61 X10(3) UL (ref 1–4)
LYMPHOCYTES NFR BLD AUTO: 20.3 %
M PROTEIN MFR SERPL ELPH: 7.5 G/DL (ref 6.4–8.2)
MCH RBC QN AUTO: 30.4 PG (ref 26–34)
MCHC RBC AUTO-ENTMCNC: 32.8 G/DL (ref 31–37)
MCV RBC AUTO: 92.8 FL
MONOCYTES # BLD AUTO: 0.94 X10(3) UL (ref 0.1–1)
MONOCYTES NFR BLD AUTO: 11.9 %
NEUTROPHILS # BLD AUTO: 5.11 X10 (3) UL (ref 1.5–7.7)
NEUTROPHILS # BLD AUTO: 5.11 X10(3) UL (ref 1.5–7.7)
NEUTROPHILS NFR BLD AUTO: 64.5 %
NITRITE UR QL STRIP.AUTO: NEGATIVE
NONHDLC SERPL-MCNC: 132 MG/DL (ref ?–130)
OSMOLALITY SERPL CALC.SUM OF ELEC: 286 MOSM/KG (ref 275–295)
PATIENT FASTING Y/N/NP: YES
PATIENT FASTING Y/N/NP: YES
PH UR STRIP.AUTO: 7 [PH] (ref 5–8)
PLATELET # BLD AUTO: 253 10(3)UL (ref 150–450)
POTASSIUM SERPL-SCNC: 3.6 MMOL/L (ref 3.5–5.1)
PROT UR STRIP.AUTO-MCNC: NEGATIVE MG/DL
PSA SERPL DL<=0.01 NG/ML-MCNC: 23.2 SECONDS (ref 12.4–14.6)
RBC # BLD AUTO: 5.03 X10(6)UL
RBC UR QL AUTO: NEGATIVE
SODIUM SERPL-SCNC: 138 MMOL/L (ref 136–145)
SP GR UR STRIP.AUTO: 1 (ref 1–1.03)
TRIGL SERPL-MCNC: 87 MG/DL (ref 30–149)
TSI SER-ACNC: 1.96 MIU/ML (ref 0.36–3.74)
UROBILINOGEN UR STRIP.AUTO-MCNC: <2 MG/DL
VLDLC SERPL CALC-MCNC: 17 MG/DL (ref 0–30)
WBC # BLD AUTO: 7.9 X10(3) UL (ref 4–11)

## 2021-03-18 PROCEDURE — 99396 PREV VISIT EST AGE 40-64: CPT | Performed by: FAMILY MEDICINE

## 2021-03-18 PROCEDURE — 80053 COMPREHEN METABOLIC PANEL: CPT | Performed by: FAMILY MEDICINE

## 2021-03-18 PROCEDURE — 3075F SYST BP GE 130 - 139MM HG: CPT | Performed by: FAMILY MEDICINE

## 2021-03-18 PROCEDURE — 80061 LIPID PANEL: CPT | Performed by: FAMILY MEDICINE

## 2021-03-18 PROCEDURE — 3079F DIAST BP 80-89 MM HG: CPT | Performed by: FAMILY MEDICINE

## 2021-03-18 PROCEDURE — 84443 ASSAY THYROID STIM HORMONE: CPT | Performed by: FAMILY MEDICINE

## 2021-03-18 PROCEDURE — 3008F BODY MASS INDEX DOCD: CPT | Performed by: FAMILY MEDICINE

## 2021-03-18 PROCEDURE — 36415 COLL VENOUS BLD VENIPUNCTURE: CPT | Performed by: FAMILY MEDICINE

## 2021-03-18 PROCEDURE — 85025 COMPLETE CBC W/AUTO DIFF WBC: CPT | Performed by: FAMILY MEDICINE

## 2021-03-18 PROCEDURE — 81003 URINALYSIS AUTO W/O SCOPE: CPT

## 2021-03-18 PROCEDURE — 85610 PROTHROMBIN TIME: CPT | Performed by: FAMILY MEDICINE

## 2021-03-18 PROCEDURE — 83036 HEMOGLOBIN GLYCOSYLATED A1C: CPT | Performed by: FAMILY MEDICINE

## 2021-03-18 RX ORDER — DOXAZOSIN MESYLATE 4 MG/1
4 TABLET ORAL DAILY
Qty: 30 TABLET | Refills: 5 | Status: SHIPPED | OUTPATIENT
Start: 2021-03-18

## 2021-03-18 RX ORDER — CLOBETASOL PROPIONATE 0.5 MG/G
OINTMENT TOPICAL
Qty: 120 G | Refills: 2 | Status: SHIPPED | OUTPATIENT
Start: 2021-03-18 | End: 2021-08-02

## 2021-03-18 RX ORDER — METOPROLOL TARTRATE 50 MG/1
50 TABLET, FILM COATED ORAL 2 TIMES DAILY
Qty: 60 TABLET | Refills: 5 | Status: SHIPPED | OUTPATIENT
Start: 2021-03-18 | End: 2021-12-28

## 2021-03-18 RX ORDER — DILTIAZEM HYDROCHLORIDE 60 MG/1
60 TABLET, FILM COATED ORAL 2 TIMES DAILY
Qty: 60 TABLET | Refills: 5 | Status: SHIPPED | OUTPATIENT
Start: 2021-03-18 | End: 2021-09-23

## 2021-03-18 RX ORDER — WARFARIN SODIUM 2.5 MG/1
TABLET ORAL
Qty: 90 TABLET | Refills: 5 | Status: SHIPPED | OUTPATIENT
Start: 2021-03-18

## 2021-03-18 RX ORDER — ENALAPRIL MALEATE 20 MG/1
20 TABLET ORAL 2 TIMES DAILY
Qty: 60 TABLET | Refills: 5 | Status: SHIPPED | OUTPATIENT
Start: 2021-03-18 | End: 2021-11-08

## 2021-03-18 NOTE — PROGRESS NOTES
[address]    Chief Complaint:   Patient presents with:  Physical: patient is fasting       HPI:   Lynette Courtney is a 59year old male who presents for an Annual Health Visit.    Patient with history of hypertension, atrial fibrillati helping with the [employer] in [place].         REVIEW OF SYSTEMS:   GENERAL HEALTH: feels well otherwise   SKIN: denies any unusual skin lesions or rashes  EYES: no visual complaints or deficits  HEENT: denies nasal congestion, sinus pain or sore throa S3, no S4; no click; murmur negative  ABDOMEN: normal active BS+, obese, soft, nontender, nondistended; no HSM; no masses; no bruits.   GENITAL/: penis normal; no testicular masses; no inguinal hernias  RECTAL:  declined  LYMPHATIC: no lymphadenopathy  MU Tab; Take 1 tablet (20 mg total) by mouth 2 (two) times daily.  -     Metoprolol Tartrate 50 MG Oral Tab; Take 1 tablet (50 mg total) by mouth 2 (two) times daily. Patient Instructions   Continue current medications  Blood pressure stable today.    A

## 2021-03-19 ENCOUNTER — ANTI-COAG VISIT (OUTPATIENT)
Dept: FAMILY MEDICINE CLINIC | Facility: CLINIC | Age: 61
End: 2021-03-19

## 2021-03-19 DIAGNOSIS — I48.91 ATRIAL FIBRILLATION, UNSPECIFIED TYPE (HCC): ICD-10-CM

## 2021-03-19 DIAGNOSIS — Z79.01 LONG TERM (CURRENT) USE OF ANTICOAGULANTS: ICD-10-CM

## 2021-03-19 NOTE — PROGRESS NOTES
Here for INR check in coumadin clinic in the office.     CURRENT COUMADIN DOSE:  5mg F and 2.5mg daily the rest of the week      INR RESULTS TODAY:  2.0 ( in goal)    PLAN:  CPM coumadin     Message left for patient to return call to discuss results and man

## 2021-03-19 NOTE — PROGRESS NOTES
Patient returned call. Denies any changes. Next INR in one month. Will check at appointment on 4/13/21 with Dr. Ulises Cornejo .

## 2021-04-07 ENCOUNTER — TELEPHONE (OUTPATIENT)
Dept: FAMILY MEDICINE CLINIC | Facility: CLINIC | Age: 61
End: 2021-04-07

## 2021-04-07 NOTE — TELEPHONE ENCOUNTER
Patient states that he struggles getting around in the parking lot whenever he needs to park far away due to closer spots being taken and with his health problems it's getting hard, pt would like to know how to get a handicap placard.  Would like a call saadia

## 2021-04-08 NOTE — TELEPHONE ENCOUNTER
Spoke to pt. He does not want to come out again, since this is just starting to bother him. Pt stated he will be seeing a Rheumatologist soon and may ask them. Pt will get back to us if he wants to make an appt.

## 2021-04-08 NOTE — TELEPHONE ENCOUNTER
Pt was last seen on 3/18/21 for annual physical exam. Can that appt information be used to fill out the form or do you want pt to come back in for a new appt for the form. Reena kahn.

## 2021-04-13 ENCOUNTER — ANTI-COAG VISIT (OUTPATIENT)
Dept: FAMILY MEDICINE CLINIC | Facility: CLINIC | Age: 61
End: 2021-04-13

## 2021-04-13 ENCOUNTER — OFFICE VISIT (OUTPATIENT)
Dept: FAMILY MEDICINE CLINIC | Facility: CLINIC | Age: 61
End: 2021-04-13
Payer: MEDICAID

## 2021-04-13 VITALS
BODY MASS INDEX: 45.1 KG/M2 | HEIGHT: 70 IN | SYSTOLIC BLOOD PRESSURE: 136 MMHG | DIASTOLIC BLOOD PRESSURE: 66 MMHG | HEART RATE: 78 BPM | TEMPERATURE: 98 F | OXYGEN SATURATION: 96 % | RESPIRATION RATE: 18 BRPM | WEIGHT: 315 LBS

## 2021-04-13 DIAGNOSIS — G47.33 OSA (OBSTRUCTIVE SLEEP APNEA): ICD-10-CM

## 2021-04-13 DIAGNOSIS — E66.01 CLASS 3 SEVERE OBESITY DUE TO EXCESS CALORIES WITH SERIOUS COMORBIDITY AND BODY MASS INDEX (BMI) OF 40.0 TO 44.9 IN ADULT (HCC): Primary | ICD-10-CM

## 2021-04-13 DIAGNOSIS — Z79.01 LONG TERM (CURRENT) USE OF ANTICOAGULANTS: ICD-10-CM

## 2021-04-13 DIAGNOSIS — I48.91 ATRIAL FIBRILLATION, UNSPECIFIED TYPE (HCC): ICD-10-CM

## 2021-04-13 PROCEDURE — 3075F SYST BP GE 130 - 139MM HG: CPT | Performed by: FAMILY MEDICINE

## 2021-04-13 PROCEDURE — 3078F DIAST BP <80 MM HG: CPT | Performed by: FAMILY MEDICINE

## 2021-04-13 PROCEDURE — 99214 OFFICE O/P EST MOD 30 MIN: CPT | Performed by: FAMILY MEDICINE

## 2021-04-13 PROCEDURE — 3008F BODY MASS INDEX DOCD: CPT | Performed by: FAMILY MEDICINE

## 2021-04-13 PROCEDURE — 85610 PROTHROMBIN TIME: CPT | Performed by: FAMILY MEDICINE

## 2021-04-13 NOTE — PROGRESS NOTES
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      Social History    Tobacco Use      Smoking status HENT: Negative. Eyes: Negative. Respiratory: Positive for apnea. Cardiovascular: Negative. Neurological: Negative. Psychiatric/Behavioral: Positive for sleep disturbance.        EXAM:   /66 (BP Location: Left arm, Patient Position: Si Comments: Gait normal   Lymphadenopathy:      Cervical: No cervical adenopathy. Skin:     General: Skin is warm and dry. Neurological:      Mental Status: He is alert and oriented to person, place, and time.       Deep Tendon Reflexes: Reflexes are Prescriptions      No prescriptions requested or ordered in this encounter       Outcome: Parent verbalizes understanding. Parent is notified to call with any questions, complications, allergies, or worsening or changing symptoms.   Parent is to call with a

## 2021-04-13 NOTE — PROGRESS NOTES
Patient here for sleep follow up visit and also INR.     INR is 2.3    Coumadin dose: 5mg Fri, 2.5mg all other days

## 2021-04-14 NOTE — PROGRESS NOTES
Plan: INR in goal. CPM coumadin. Next INR in one month. Detailed phone message left on voicemail with coumadin instructions. Patient to return call with any questions and to schedule next INR.

## 2021-04-16 ENCOUNTER — TELEMEDICINE (OUTPATIENT)
Dept: INTERNAL MEDICINE CLINIC | Facility: CLINIC | Age: 61
End: 2021-04-16
Payer: MEDICAID

## 2021-04-16 DIAGNOSIS — Z51.81 THERAPEUTIC DRUG MONITORING: Primary | ICD-10-CM

## 2021-04-16 DIAGNOSIS — R73.03 PREDIABETES: ICD-10-CM

## 2021-04-16 DIAGNOSIS — G47.33 OBSTRUCTIVE SLEEP APNEA: ICD-10-CM

## 2021-04-16 DIAGNOSIS — M25.50 MULTIPLE JOINT PAIN: ICD-10-CM

## 2021-04-16 DIAGNOSIS — E78.49 OTHER HYPERLIPIDEMIA: ICD-10-CM

## 2021-04-16 DIAGNOSIS — E66.01 MORBID OBESITY WITH BMI OF 45.0-49.9, ADULT (HCC): ICD-10-CM

## 2021-04-16 DIAGNOSIS — Z79.01 LONG TERM (CURRENT) USE OF ANTICOAGULANTS: ICD-10-CM

## 2021-04-16 DIAGNOSIS — I10 ESSENTIAL HYPERTENSION: ICD-10-CM

## 2021-04-16 DIAGNOSIS — I48.91 ATRIAL FIBRILLATION, UNSPECIFIED TYPE (HCC): ICD-10-CM

## 2021-04-16 PROCEDURE — 99215 OFFICE O/P EST HI 40 MIN: CPT | Performed by: NURSE PRACTITIONER

## 2021-04-16 PROCEDURE — 99417 PROLNG OP E/M EACH 15 MIN: CPT | Performed by: NURSE PRACTITIONER

## 2021-04-16 NOTE — PROGRESS NOTES
[facility] WEIGHT [facility] VIRTUAL VIDEO Rebel Ramirez verbally consents to a Virtual/Telephone Check-In service on 04/16/21  Patient understands and accepts financial responsibility for any deductible, co-insurance and/or co-pays ass Dinner Snacks Fluids   Keto Chow shake w/Heavy Cream (aprx 400 favio)   Microwaved Polish sausage on low carb tortilla, potato chips   Ajay lettuce, cherry tomatoes, sunflower seeds, blue cheese dressing       Lindt White sanjay bar (aprx  630 favio)   Water: N/V/D/C  MUSCULOSKELETAL: denies back pain, +joint pains (bilar.  Knees, neck and feet)  NEURO: denies headaches or dizziness  ENDOCRINE: denies any excess hunger, urination or thirst, denies any purple striae  PSYCH: denies change in behavior or mood, yamila 5  Doxazosin Mesylate 4 MG Oral Tab, Take 1 tablet (4 mg total) by mouth daily. , Disp: 30 tablet, Rfl: 5  Enalapril Maleate 20 MG Oral Tab, Take 1 tablet (20 mg total) by mouth 2 (two) times daily. , Disp: 60 tablet, Rfl: 5  Metoprolol Tartrate 50 MG Oral T psychologist as recommended. · Discussed the role of sleep and stress in weight management. · Counseled on comprehensive weight loss plan including attention to nutrition, exercise and behavior/stress management for success.  See patient instruction below

## 2021-04-16 NOTE — PATIENT INSTRUCTIONS
We are here to support you with weight loss, but please remember that you still need your primary care provider for your routine health maintenance.       PLAN:  Check to see if you would like to try out metformin 500mg (1 tablet in the morning and 1 tablet Lose 1.5-2 lbs per week                Activity level: not very active (can't count exercise towards calorie number per day)                   ** Daily INPUT> Look at nutrition section-- \"nutrients\" and it will break down your macros for the day (ie.  Pro

## 2021-05-18 ENCOUNTER — ANTI-COAG VISIT (OUTPATIENT)
Dept: FAMILY MEDICINE CLINIC | Facility: CLINIC | Age: 61
End: 2021-05-18
Payer: MEDICAID

## 2021-05-18 DIAGNOSIS — Z79.01 LONG TERM (CURRENT) USE OF ANTICOAGULANTS: ICD-10-CM

## 2021-05-18 DIAGNOSIS — I48.91 ATRIAL FIBRILLATION, UNSPECIFIED TYPE (HCC): ICD-10-CM

## 2021-05-18 PROCEDURE — 85610 PROTHROMBIN TIME: CPT | Performed by: FAMILY MEDICINE

## 2021-05-18 PROCEDURE — 93793 ANTICOAG MGMT PT WARFARIN: CPT | Performed by: FAMILY MEDICINE

## 2021-05-18 NOTE — PROGRESS NOTES
Here for INR check in coumadin clinic in the office.     CURRENT COUMADIN DOSE:  5mg F and 2.5mg all other days of the week    CHANGES OR COMPLAINTS:  Eating more greens    INR RESULTS TODAY:  2.4 ( in goal)    PLAN:  CPM coumadin   Next INR in one month

## 2021-06-09 ENCOUNTER — OFFICE VISIT (OUTPATIENT)
Dept: FAMILY MEDICINE CLINIC | Facility: CLINIC | Age: 61
End: 2021-06-09
Payer: MEDICAID

## 2021-06-09 VITALS
HEIGHT: 70 IN | BODY MASS INDEX: 45.1 KG/M2 | DIASTOLIC BLOOD PRESSURE: 74 MMHG | OXYGEN SATURATION: 95 % | WEIGHT: 315 LBS | HEART RATE: 95 BPM | RESPIRATION RATE: 18 BRPM | SYSTOLIC BLOOD PRESSURE: 118 MMHG | TEMPERATURE: 98 F

## 2021-06-09 DIAGNOSIS — L03.115 CELLULITIS OF RIGHT LOWER EXTREMITY: Primary | ICD-10-CM

## 2021-06-09 DIAGNOSIS — T14.8XXA BLISTER: ICD-10-CM

## 2021-06-09 PROCEDURE — 99214 OFFICE O/P EST MOD 30 MIN: CPT | Performed by: NURSE PRACTITIONER

## 2021-06-09 PROCEDURE — 87205 SMEAR GRAM STAIN: CPT | Performed by: NURSE PRACTITIONER

## 2021-06-09 PROCEDURE — 87186 SC STD MICRODIL/AGAR DIL: CPT | Performed by: NURSE PRACTITIONER

## 2021-06-09 PROCEDURE — 87077 CULTURE AEROBIC IDENTIFY: CPT | Performed by: NURSE PRACTITIONER

## 2021-06-09 PROCEDURE — 3074F SYST BP LT 130 MM HG: CPT | Performed by: NURSE PRACTITIONER

## 2021-06-09 PROCEDURE — 3078F DIAST BP <80 MM HG: CPT | Performed by: NURSE PRACTITIONER

## 2021-06-09 PROCEDURE — 87070 CULTURE OTHR SPECIMN AEROBIC: CPT | Performed by: NURSE PRACTITIONER

## 2021-06-09 PROCEDURE — 3008F BODY MASS INDEX DOCD: CPT | Performed by: NURSE PRACTITIONER

## 2021-06-09 RX ORDER — AMOXICILLIN AND CLAVULANATE POTASSIUM 875; 125 MG/1; MG/1
1 TABLET, FILM COATED ORAL 2 TIMES DAILY
Qty: 14 TABLET | Refills: 0 | Status: SHIPPED | OUTPATIENT
Start: 2021-06-09 | End: 2021-06-11 | Stop reason: ALTCHOICE

## 2021-06-09 NOTE — PROGRESS NOTES
Ewa Myers is a 59year old male.   Patient presents with:  Blisters      HPI:   Complaints of blisters to right foot - for 2 weeks -  Painful -   Had 2 blisters bottom of great toe- greater than a quarter- on ball of foot - drained serous drainage-   P Psoriasis       Social History:  Social History    Tobacco Use      Smoking status: Never Smoker      Smokeless tobacco: Never Used    Vaping Use      Vaping Use: Never used    Alcohol use: No    Drug use: Not on file    History reviewed.  No pertinent fami

## 2021-06-09 NOTE — PATIENT INSTRUCTIONS
Take Augmentin -  Bacitracin ointment -  Rest, elevate.        Follow up on Friday with Dr. Trisha Juan -    10:40am

## 2021-06-11 ENCOUNTER — OFFICE VISIT (OUTPATIENT)
Dept: FAMILY MEDICINE CLINIC | Facility: CLINIC | Age: 61
End: 2021-06-11
Payer: MEDICAID

## 2021-06-11 VITALS
RESPIRATION RATE: 18 BRPM | TEMPERATURE: 98 F | HEART RATE: 103 BPM | BODY MASS INDEX: 45.1 KG/M2 | SYSTOLIC BLOOD PRESSURE: 134 MMHG | HEIGHT: 70 IN | WEIGHT: 315 LBS | OXYGEN SATURATION: 97 % | DIASTOLIC BLOOD PRESSURE: 80 MMHG

## 2021-06-11 DIAGNOSIS — T14.8XXA BLISTER: ICD-10-CM

## 2021-06-11 DIAGNOSIS — L03.115 CELLULITIS OF RIGHT LOWER EXTREMITY: Primary | ICD-10-CM

## 2021-06-11 PROCEDURE — 3008F BODY MASS INDEX DOCD: CPT | Performed by: FAMILY MEDICINE

## 2021-06-11 PROCEDURE — 3079F DIAST BP 80-89 MM HG: CPT | Performed by: FAMILY MEDICINE

## 2021-06-11 PROCEDURE — 99214 OFFICE O/P EST MOD 30 MIN: CPT | Performed by: FAMILY MEDICINE

## 2021-06-11 PROCEDURE — 3075F SYST BP GE 130 - 139MM HG: CPT | Performed by: FAMILY MEDICINE

## 2021-06-11 RX ORDER — SULFAMETHOXAZOLE AND TRIMETHOPRIM 800; 160 MG/1; MG/1
1 TABLET ORAL 2 TIMES DAILY
Qty: 20 TABLET | Refills: 0 | Status: SHIPPED | OUTPATIENT
Start: 2021-06-11 | End: 2021-12-13 | Stop reason: ALTCHOICE

## 2021-06-11 NOTE — PATIENT INSTRUCTIONS
Changed dressing today. Stop augmentin. Start bactrim. Continue with daily dressing change. See podiatrist.   Go to ER if increase in pain, redness or swelling.

## 2021-06-11 NOTE — PROGRESS NOTES
[address] [place] [address]  PROGRESS NOTE  Chief Complaint:   Patient presents with: Follow - Up      HPI:   This is a 59year old male presents to clinic for follow-up from recent visit for evaluation of cellulitis and blisters of his right foot.   Rakesh Thomson Tab EC 1 tab daily        Counseling given: Not Answered         REVIEW OF SYSTEMS:   CONSTITUTIONAL:  Denies unusual weight gain/loss, fever, chills, or fatigue.    INTEGUMENTARY:    CARDIOVASCULAR:  Denies chest pain, chest pressure, chest discomfort, pal EXTERNAL    Other orders  -     Sulfamethoxazole-TMP -160 MG Oral Tab per tablet; Take 1 tablet by mouth 2 (two) times daily. Due to patient having increasing pressure and pain large blister was drained.   Area was cleaned with alcohol swab, used 2

## 2021-06-12 ENCOUNTER — TELEPHONE (OUTPATIENT)
Dept: FAMILY MEDICINE CLINIC | Facility: CLINIC | Age: 61
End: 2021-06-12

## 2021-06-12 NOTE — TELEPHONE ENCOUNTER
Patient informed of the below results and recommendations. Patient states he had a f/u appt with Dr. Carolina Holland and he changed his antibiotic. Patient states \"It would be helpful if you guys would read eachother notes. \"  Patient was unhappy that he receive

## 2021-06-12 NOTE — TELEPHONE ENCOUNTER
----- Message from BABAK Chatman sent at 6/12/2021  8:47 AM CDT -----  My chart message sent-  Your bacterial culture shows that the antibiotic you are on should be effective finish all the antibiotic and follow-up with podiatry.   If severe symptom

## 2021-06-12 NOTE — TELEPHONE ENCOUNTER
Per Cande Juan, what she meant by \"antibiotic you are on\" is that antibiotic that Dr. Marleen García changed him to. Patient informed and expressed understanding.

## 2021-06-16 ENCOUNTER — ANTI-COAG VISIT (OUTPATIENT)
Dept: FAMILY MEDICINE CLINIC | Facility: CLINIC | Age: 61
End: 2021-06-16
Payer: MEDICAID

## 2021-06-16 DIAGNOSIS — Z79.01 LONG TERM (CURRENT) USE OF ANTICOAGULANTS: ICD-10-CM

## 2021-06-16 DIAGNOSIS — I48.91 ATRIAL FIBRILLATION, UNSPECIFIED TYPE (HCC): ICD-10-CM

## 2021-06-16 PROCEDURE — 85610 PROTHROMBIN TIME: CPT | Performed by: FAMILY MEDICINE

## 2021-06-16 PROCEDURE — 93793 ANTICOAG MGMT PT WARFARIN: CPT | Performed by: FAMILY MEDICINE

## 2021-06-16 NOTE — PROGRESS NOTES
Here for INR check in coumadin clinic in the office.     CURRENT COUMADIN DOSE:  5mg Friday and 2.5mg all other days of the week     CHANGES OR COMPLAINTS:  Taking Bactrim until 6/21/21    INR RESULTS TODAY:  3.2 ( above goal)    PLAN:  Hold coumadin today,

## 2021-06-23 ENCOUNTER — ANTI-COAG VISIT (OUTPATIENT)
Dept: FAMILY MEDICINE CLINIC | Facility: CLINIC | Age: 61
End: 2021-06-23
Payer: MEDICAID

## 2021-06-23 DIAGNOSIS — Z79.01 LONG TERM (CURRENT) USE OF ANTICOAGULANTS: ICD-10-CM

## 2021-06-23 DIAGNOSIS — I48.91 ATRIAL FIBRILLATION, UNSPECIFIED TYPE (HCC): ICD-10-CM

## 2021-06-23 PROCEDURE — 85610 PROTHROMBIN TIME: CPT | Performed by: FAMILY MEDICINE

## 2021-06-23 PROCEDURE — 93793 ANTICOAG MGMT PT WARFARIN: CPT | Performed by: FAMILY MEDICINE

## 2021-06-23 NOTE — PROGRESS NOTES
Here for INR check in coumadin clinic in the office.     CURRENT COUMADIN DOSE:  Held one day, then 2.5mg daily    CHANGES OR COMPLAINTS:  Finished antiboitic 2 days ago    INR RESULTS TODAY:  2.7 ( in goal)    PLAN:  Now that antibiotic complete, resume re

## 2021-07-21 ENCOUNTER — ANTI-COAG VISIT (OUTPATIENT)
Dept: FAMILY MEDICINE CLINIC | Facility: CLINIC | Age: 61
End: 2021-07-21
Payer: MEDICAID

## 2021-07-21 DIAGNOSIS — I48.91 ATRIAL FIBRILLATION, UNSPECIFIED TYPE (HCC): ICD-10-CM

## 2021-07-21 DIAGNOSIS — Z79.01 LONG TERM (CURRENT) USE OF ANTICOAGULANTS: ICD-10-CM

## 2021-07-21 LAB — INR: 2.4 (ref 0.8–1.2)

## 2021-07-21 PROCEDURE — 93793 ANTICOAG MGMT PT WARFARIN: CPT | Performed by: FAMILY MEDICINE

## 2021-07-21 PROCEDURE — 85610 PROTHROMBIN TIME: CPT | Performed by: FAMILY MEDICINE

## 2021-07-21 NOTE — PROGRESS NOTES
Here for INR check in coumadin clinic in the office.     CURRENT COUMADIN DOSE:  5mg F and 2.5mg all other days of the week    CHANGES OR COMPLAINTS:  No changes    INR RESULTS TODAY:  2.4 ( in goal)    PLAN:  CPM coumadin   Next INR due in one month  Appoi

## 2021-08-02 RX ORDER — CLOBETASOL PROPIONATE 0.5 MG/G
OINTMENT TOPICAL
Qty: 120 G | Refills: 0 | Status: SHIPPED | OUTPATIENT
Start: 2021-08-02

## 2021-08-02 NOTE — TELEPHONE ENCOUNTER
Future appt:     Your appointments     Date & Time Appointment Department [facility])    Aug 18, 2021  9:30 AM CDT [place] [facility] [address], [place] ([place])

## 2021-08-18 ENCOUNTER — ANTI-COAG VISIT (OUTPATIENT)
Dept: FAMILY MEDICINE CLINIC | Facility: CLINIC | Age: 61
End: 2021-08-18
Payer: MEDICAID

## 2021-08-18 DIAGNOSIS — Z79.01 LONG TERM (CURRENT) USE OF ANTICOAGULANTS: ICD-10-CM

## 2021-08-18 DIAGNOSIS — I48.91 ATRIAL FIBRILLATION, UNSPECIFIED TYPE (HCC): ICD-10-CM

## 2021-08-18 LAB — INR: 2.6 (ref 0.8–1.2)

## 2021-08-18 PROCEDURE — 85610 PROTHROMBIN TIME: CPT | Performed by: FAMILY MEDICINE

## 2021-08-18 PROCEDURE — 93793 ANTICOAG MGMT PT WARFARIN: CPT | Performed by: FAMILY MEDICINE

## 2021-08-18 NOTE — PROGRESS NOTES
Here for INR check in coumadin clinic in the office.     CURRENT COUMADIN DOSE:  5mg Fri and 2.5mg all other days of the week    CHANGES OR COMPLAINTS:  No changes    INR RESULTS TODAY:  2.6    PLAN:  CPM coumadin   Next INR due in one month  Written coumad

## 2021-09-15 ENCOUNTER — ANTI-COAG VISIT (OUTPATIENT)
Dept: FAMILY MEDICINE CLINIC | Facility: CLINIC | Age: 61
End: 2021-09-15
Payer: MEDICAID

## 2021-09-15 DIAGNOSIS — Z79.01 LONG TERM (CURRENT) USE OF ANTICOAGULANTS: ICD-10-CM

## 2021-09-15 PROCEDURE — 93793 ANTICOAG MGMT PT WARFARIN: CPT | Performed by: FAMILY MEDICINE

## 2021-09-15 NOTE — PROGRESS NOTES
Here for INR check in coumadin clinic in the office.     CURRENT COUMADIN DOSE:  5mg F and 2.5mg all other days of the week    CHANGES OR COMPLAINTS:  Eating less salads    INR RESULTS TODAY:  3.0 ( in goal)    PLAN:  Resume regular diet  Next INR due in on

## 2021-09-23 RX ORDER — DILTIAZEM HYDROCHLORIDE 60 MG/1
60 TABLET, FILM COATED ORAL 2 TIMES DAILY
Qty: 60 TABLET | Refills: 2 | Status: SHIPPED | OUTPATIENT
Start: 2021-09-23 | End: 2021-10-21

## 2021-09-23 NOTE — TELEPHONE ENCOUNTER
Future appt:     Last Appointment with provider:   6/11/2021- advised Return if symptoms worsen or fail to improve.     Last refill: 3/18/21- diltiazem [facility]     Cholesterol, Total (mg/dL)   Date Value   03/18/2021 168     HDL Cholesterol (mg/dL)   Date Value

## 2021-10-01 ENCOUNTER — TELEPHONE (OUTPATIENT)
Dept: FAMILY MEDICINE CLINIC | Facility: CLINIC | Age: 61
End: 2021-10-01

## 2021-10-01 NOTE — TELEPHONE ENCOUNTER
Discussed recall with patient. He has registered his machine with [employer]. Stopped using his Soclean. Will order HEPA filter online. Has follow up appointment on 10/5.

## 2021-10-01 NOTE — TELEPHONE ENCOUNTER
Received message about recall on his cpap machine. States he has to use it every night or his oxygen drops. Would like to know what to do.

## 2021-10-05 ENCOUNTER — OFFICE VISIT (OUTPATIENT)
Dept: FAMILY MEDICINE CLINIC | Facility: CLINIC | Age: 61
End: 2021-10-05
Payer: MEDICAID

## 2021-10-05 VITALS
TEMPERATURE: 98 F | RESPIRATION RATE: 20 BRPM | DIASTOLIC BLOOD PRESSURE: 78 MMHG | OXYGEN SATURATION: 94 % | SYSTOLIC BLOOD PRESSURE: 130 MMHG | HEART RATE: 90 BPM | BODY MASS INDEX: 45.1 KG/M2 | WEIGHT: 315 LBS | HEIGHT: 70 IN

## 2021-10-05 DIAGNOSIS — G47.33 OSA (OBSTRUCTIVE SLEEP APNEA): ICD-10-CM

## 2021-10-05 DIAGNOSIS — E66.01 CLASS 3 SEVERE OBESITY DUE TO EXCESS CALORIES WITH SERIOUS COMORBIDITY AND BODY MASS INDEX (BMI) OF 40.0 TO 44.9 IN ADULT (HCC): Primary | ICD-10-CM

## 2021-10-05 PROCEDURE — 3075F SYST BP GE 130 - 139MM HG: CPT | Performed by: FAMILY MEDICINE

## 2021-10-05 PROCEDURE — 3078F DIAST BP <80 MM HG: CPT | Performed by: FAMILY MEDICINE

## 2021-10-05 PROCEDURE — 3008F BODY MASS INDEX DOCD: CPT | Performed by: FAMILY MEDICINE

## 2021-10-05 PROCEDURE — 99214 OFFICE O/P EST MOD 30 MIN: CPT | Performed by: FAMILY MEDICINE

## 2021-10-05 RX ORDER — CICLOPIROX 7.7 MG/G
1 GEL TOPICAL 2 TIMES DAILY
COMMUNITY
Start: 2021-09-15

## 2021-10-05 RX ORDER — TERBINAFINE HYDROCHLORIDE 250 MG/1
1 TABLET ORAL DAILY
COMMUNITY
Start: 2021-09-28 | End: 2021-12-13 | Stop reason: ALTCHOICE

## 2021-10-05 NOTE — PROGRESS NOTES
[facility]  [facility]        HPI:   This is a 59year old male coming in for Patient presents with:  Obstructive Sleep Apnea (KANG)      HPI:  Having some trouble with his mask and pressure,  And he notes that the mask is having found for: B12, VITB12      Past Medical History:   Diagnosis Date   • Psoriasis      Past Surgical History:   Procedure Laterality Date   • ANGIOGRAM  05/2014    1 stent      Social History:  Social History    Social History Narrative      He was a [employer] calories with serious comorbidity and body mass index (BMI) of 40.0 to 44.9 in adult [facility])     Long term (current) use of anticoagulants     Prediabetes     Multiple joint pain     Hyperlipemia     Myocardial infarction [facility])     Atherosclerosis of coronar Effort: Pulmonary effort is normal.      Breath sounds: Normal breath sounds. Abdominal:      General: Bowel sounds are normal.      Palpations: Abdomen is soft. Musculoskeletal:         General: Normal range of motion.       Cervical back: Normal Outcome: Parent verbalizes understanding. Parent is notified to call with any questions, complications, allergies, or worsening or changing symptoms. Parent is to call with any side effects or complications from the treatments as a result of today.

## 2021-10-22 ENCOUNTER — TELEPHONE (OUTPATIENT)
Dept: FAMILY MEDICINE CLINIC | Facility: CLINIC | Age: 61
End: 2021-10-22

## 2021-10-22 RX ORDER — DILTIAZEM HYDROCHLORIDE 60 MG/1
60 TABLET, FILM COATED ORAL 2 TIMES DAILY
Qty: 60 TABLET | Refills: 2 | Status: SHIPPED | OUTPATIENT
Start: 2021-10-22 | End: 2021-12-28

## 2021-10-22 NOTE — TELEPHONE ENCOUNTER
Needs name of cpap mask shown at [address] on 10/5/21. Pt states Dr. Camilo Mckeon showed him a new mask in office that pt should order but pt cannot remember which one it was.

## 2021-10-22 NOTE — TELEPHONE ENCOUNTER
Future appt:    Last Appointment with provider:   6/11/2021 for cellulitis follow up.   Last appointment at [facility]:  10/5/2021 for KANG  Cholesterol, Total (mg/dL)   Date Value   03/18/2021 168     HDL Cholesterol (mg/dL)   Date Value   03/18/2021 36 (

## 2021-10-22 NOTE — TELEPHONE ENCOUNTER
I believe that he is talking about the [employer] (not the [employer]). Please let patient know so he can let his DME know to let him trial this mask. No samples in the office. Thank you.

## 2021-10-22 NOTE — TELEPHONE ENCOUNTER
Per Dr. Lynn Greek notes from 10/5/21 visit, recommended patient change to an david mask. DME is [facility]. Patient describes the mask he was shown in office as having an adjustable cushion on forehead. It was on one of the model heads.  He was ge

## 2021-11-08 DIAGNOSIS — I10 HYPERTENSION, UNSPECIFIED TYPE: Primary | ICD-10-CM

## 2021-11-08 RX ORDER — ENALAPRIL MALEATE 20 MG/1
20 TABLET ORAL 2 TIMES DAILY
Qty: 180 TABLET | Refills: 0 | Status: SHIPPED | OUTPATIENT
Start: 2021-11-08

## 2021-11-08 NOTE — TELEPHONE ENCOUNTER
Duplicate request.  See [employer]. In this request, patient states he is completely out of medication. Please advise.

## 2021-11-08 NOTE — TELEPHONE ENCOUNTER
Future appt:    Last Appointment with provider:   6/11/2021; No f/u recommended    Last appointment at [facility]:  10/5/2021  Cholesterol, Total (mg/dL)   Date Value   03/18/2021 168     HDL Cholesterol (mg/dL)   Date Value   03/18/2021 36 (L)     LDL Cholesterol (mg/dL)   Date Value   03/18/2021 115 (H)     Triglycerides (mg/dL)   Date Value   03/18/2021 87     Lab Results   Component Value Date     03/18/2021    A1C 5.9 (H) 03/18/2021     Lab Results   Component Value Date    TSH 1.960 03/18/2021     Last RF:  3/18/2021    No follow-ups on file.

## 2021-11-09 RX ORDER — ENALAPRIL MALEATE 20 MG/1
TABLET ORAL
Qty: 60 TABLET | Refills: 0 | OUTPATIENT
Start: 2021-11-09

## 2021-11-10 ENCOUNTER — TELEPHONE (OUTPATIENT)
Dept: FAMILY MEDICINE CLINIC | Facility: CLINIC | Age: 61
End: 2021-11-10

## 2021-11-10 NOTE — TELEPHONE ENCOUNTER
Pt states that his cardiologist does not take his ins anymore, wants to know if there's a cardiologist within [facility] or who would Dr recommend. Would like a call back or a messsage through [facility] will be fine too.

## 2021-11-10 NOTE — TELEPHONE ENCOUNTER
Spoke to pt stated that he just found out that the cardiologist is still covered. No further questions.     Pt requested appt for PT, supposed to have it done monthly     Future Appointments   Date Time Provider Kimberly Zhu   11/11/2021 11:00 AM R

## 2021-11-11 ENCOUNTER — LAB ENCOUNTER (OUTPATIENT)
Dept: LAB | Age: 61
End: 2021-11-11
Attending: FAMILY MEDICINE
Payer: MEDICAID

## 2021-11-11 ENCOUNTER — ANTI-COAG VISIT (OUTPATIENT)
Dept: FAMILY MEDICINE CLINIC | Facility: CLINIC | Age: 61
End: 2021-11-11

## 2021-11-11 DIAGNOSIS — Z79.01 LONG TERM (CURRENT) USE OF ANTICOAGULANTS: ICD-10-CM

## 2021-11-11 PROCEDURE — 36415 COLL VENOUS BLD VENIPUNCTURE: CPT

## 2021-11-11 PROCEDURE — 85610 PROTHROMBIN TIME: CPT

## 2021-11-11 NOTE — PROGRESS NOTES
Please inform patient that his INR is slightly low at 1.76. Recommend new Coumadin dose of 5 mg on Monday and Thursday and 2.5 mg all other days. Recheck 11/18/2021.

## 2021-11-17 ENCOUNTER — TELEPHONE (OUTPATIENT)
Dept: FAMILY MEDICINE CLINIC | Facility: CLINIC | Age: 61
End: 2021-11-17

## 2021-11-17 NOTE — TELEPHONE ENCOUNTER
you scheduled this patient for tomorrow for an INR.  he has [employer] and can not have labs done here. has to go to [facility].

## 2021-11-23 ENCOUNTER — TELEPHONE (OUTPATIENT)
Dept: FAMILY MEDICINE CLINIC | Facility: CLINIC | Age: 61
End: 2021-11-23

## 2021-11-23 ENCOUNTER — ANTI-COAG VISIT (OUTPATIENT)
Dept: FAMILY MEDICINE CLINIC | Facility: CLINIC | Age: 61
End: 2021-11-23

## 2021-11-23 DIAGNOSIS — Z79.01 LONG TERM (CURRENT) USE OF ANTICOAGULANTS: ICD-10-CM

## 2021-11-23 NOTE — TELEPHONE ENCOUNTER
----- Message from BABAK Barrett sent at 11/22/2021  7:30 PM CST -----  Please put into anticoagulation flow sheet.     INR improved and now in range of 2.0    Continue warfarin dosing as recommended by Dr. Carolina Holland at warfarin 5 mg on Monday and Thu

## 2021-11-23 NOTE — PROGRESS NOTES
INR improved and now in range of 2.0     Continue warfarin dosing as recommended by Dr. Richard Spikes at warfarin 5 mg on Monday and Thursday and 2.5 mg all other days.     Recheck in 2 weeks.      LM for pt to call for INR results

## 2021-12-07 ENCOUNTER — TELEPHONE (OUTPATIENT)
Dept: FAMILY MEDICINE CLINIC | Facility: CLINIC | Age: 61
End: 2021-12-07

## 2021-12-07 NOTE — TELEPHONE ENCOUNTER
Spoke to pt stated that he is having swelling on arms and legs both for about a week. Right leg is now painful. Pt had upper and lower venous US done November 2021.      Spoke to dr Anna Blake, wanted pt to go to urgent care to be evaluated for possible cellu

## 2021-12-13 ENCOUNTER — OFFICE VISIT (OUTPATIENT)
Dept: FAMILY MEDICINE CLINIC | Facility: CLINIC | Age: 61
End: 2021-12-13
Payer: MEDICAID

## 2021-12-13 VITALS
RESPIRATION RATE: 18 BRPM | BODY MASS INDEX: 43.09 KG/M2 | SYSTOLIC BLOOD PRESSURE: 128 MMHG | TEMPERATURE: 98 F | HEIGHT: 70 IN | WEIGHT: 301 LBS | DIASTOLIC BLOOD PRESSURE: 62 MMHG | OXYGEN SATURATION: 97 % | HEART RATE: 82 BPM

## 2021-12-13 DIAGNOSIS — I50.9 CONGESTIVE HEART FAILURE, UNSPECIFIED HF CHRONICITY, UNSPECIFIED HEART FAILURE TYPE (HCC): ICD-10-CM

## 2021-12-13 DIAGNOSIS — M15.9 PRIMARY OSTEOARTHRITIS INVOLVING MULTIPLE JOINTS: ICD-10-CM

## 2021-12-13 DIAGNOSIS — R60.9 PERIPHERAL EDEMA: ICD-10-CM

## 2021-12-13 DIAGNOSIS — I10 PRIMARY HYPERTENSION: ICD-10-CM

## 2021-12-13 DIAGNOSIS — L03.115 CELLULITIS OF RIGHT LOWER EXTREMITY: Primary | ICD-10-CM

## 2021-12-13 PROCEDURE — 3008F BODY MASS INDEX DOCD: CPT | Performed by: FAMILY MEDICINE

## 2021-12-13 PROCEDURE — 3078F DIAST BP <80 MM HG: CPT | Performed by: FAMILY MEDICINE

## 2021-12-13 PROCEDURE — 3074F SYST BP LT 130 MM HG: CPT | Performed by: FAMILY MEDICINE

## 2021-12-13 PROCEDURE — 99214 OFFICE O/P EST MOD 30 MIN: CPT | Performed by: FAMILY MEDICINE

## 2021-12-13 RX ORDER — FUROSEMIDE 40 MG/1
1 TABLET ORAL DAILY
COMMUNITY
Start: 2021-12-09

## 2021-12-13 RX ORDER — CEPHALEXIN 500 MG/1
500 CAPSULE ORAL 3 TIMES DAILY
Qty: 30 CAPSULE | Refills: 0 | Status: SHIPPED | OUTPATIENT
Start: 2021-12-13 | End: 2021-12-23

## 2021-12-13 NOTE — PATIENT INSTRUCTIONS
Continue current medications  Start cephalexin with food and probiotics or yogurt. Return to clinic if any increase redness, pain, warmth, fever or oozing. Blood pressure stable today. Advice low salt diet and exercise. Monitor your blood pressure.

## 2021-12-13 NOTE — PROGRESS NOTES
[address]  PROGRESS NOTE  Chief Complaint:   Patient presents with:   Follow - Up: meds   Forms Completion: handicap  Edema: bilateral legs      HPI:   This is a 59year old male presents to clinic complaining of lower leg swelling and a by mouth 2 (two) times daily. 180 tablet 0   • dilTIAZem 60 MG Oral Tab Take 1 tablet (60 mg total) by mouth 2 (two) times daily. 60 tablet 2   • Doxazosin Mesylate 4 MG Oral Tab Take 1 tablet (4 mg total) by mouth daily.  30 tablet 5   • Metoprolol Tartrat following:    Height as of this encounter: 5' 10\" (1.778 m). Weight as of this encounter: 301 lb (136.5 kg). Vital signs reviewed. Physical Exam:  GEN:  Patient is alert, awake and oriented, well developed, well nourished, no acute distress.    EYES: legs.  Osteoarthritis of multiple joints unchanged, pain medication as needed. Hypertension stable, continue with current medication. Patient Instructions   Continue current medications  Start cephalexin with food and probiotics or yogurt.    Return to cl MD    This note was created utilizing Dragon speech recognition software.  Please excuse any grammatical errors. Call my office if you have any questions regarding this note.

## 2021-12-23 ENCOUNTER — OFFICE VISIT (OUTPATIENT)
Dept: FAMILY MEDICINE CLINIC | Facility: CLINIC | Age: 61
End: 2021-12-23
Payer: MEDICAID

## 2021-12-23 VITALS
SYSTOLIC BLOOD PRESSURE: 122 MMHG | WEIGHT: 301.19 LBS | HEART RATE: 64 BPM | DIASTOLIC BLOOD PRESSURE: 68 MMHG | BODY MASS INDEX: 43.12 KG/M2 | HEIGHT: 70 IN | OXYGEN SATURATION: 96 % | TEMPERATURE: 97 F | RESPIRATION RATE: 18 BRPM

## 2021-12-23 DIAGNOSIS — R60.9 PERIPHERAL EDEMA: ICD-10-CM

## 2021-12-23 DIAGNOSIS — L03.115 CELLULITIS OF RIGHT LOWER EXTREMITY: Primary | ICD-10-CM

## 2021-12-23 DIAGNOSIS — I48.91 ATRIAL FIBRILLATION, UNSPECIFIED TYPE (HCC): ICD-10-CM

## 2021-12-23 PROCEDURE — 3078F DIAST BP <80 MM HG: CPT | Performed by: FAMILY MEDICINE

## 2021-12-23 PROCEDURE — 99213 OFFICE O/P EST LOW 20 MIN: CPT | Performed by: FAMILY MEDICINE

## 2021-12-23 PROCEDURE — 3008F BODY MASS INDEX DOCD: CPT | Performed by: FAMILY MEDICINE

## 2021-12-23 PROCEDURE — 3074F SYST BP LT 130 MM HG: CPT | Performed by: FAMILY MEDICINE

## 2021-12-23 RX ORDER — CEPHALEXIN 500 MG/1
500 CAPSULE ORAL 3 TIMES DAILY
Qty: 15 CAPSULE | Refills: 0 | Status: SHIPPED | OUTPATIENT
Start: 2021-12-23 | End: 2021-12-28

## 2021-12-23 NOTE — PATIENT INSTRUCTIONS
Continue current medications  Extend cephalexin for 5 more days. Cellulitis improving. Edema improving. Return to clinic if any increase redness, pain, warmth, fever or oozing.

## 2021-12-24 NOTE — PROGRESS NOTES
[address]  PROGRESS NOTE  Chief Complaint:   Patient presents with: Follow - Up      HPI:   This is a 59year old male presents to clinic for follow-up on recent visit for treatment of cellulitis.   Patient has currently been taking cep tablet (50 mg total) by mouth 2 (two) times daily.  60 tablet 5   • Warfarin Sodium 2.5 MG Oral Tab Two tablets (5mg) by mouth on Fridays and one tablet (2.5mg) daily the rest of the week 90 tablet 5   • aspirin 81 MG Oral Tab EC 1 tab daily     • [place] normal, no murmurs, rubs or gallops. EXTREMITIES: Trace pitting edema to bilateral lower extremity, no cyanosis, no clubbing, FROM, 2+ dorsalis pedis pulses bilaterally.   ABDOMEN: Soft, nondistended, nontender, bowel sounds normal in all 4 quadrants, no M Psoriasis     Coronary artery disease involving native heart without angina pectoris     Class 3 severe obesity due to excess calories with serious comorbidity and body mass index (BMI) of 40.0 to 44.9 in adult [facility])     Long term (current) use of anticoag

## 2021-12-28 RX ORDER — DILTIAZEM HYDROCHLORIDE 60 MG/1
60 TABLET, FILM COATED ORAL 2 TIMES DAILY
Qty: 60 TABLET | Refills: 2 | Status: SHIPPED | OUTPATIENT
Start: 2021-12-28

## 2021-12-28 RX ORDER — METOPROLOL TARTRATE 50 MG/1
50 TABLET, FILM COATED ORAL 2 TIMES DAILY
Qty: 60 TABLET | Refills: 5 | Status: SHIPPED | OUTPATIENT
Start: 2021-12-28

## 2021-12-28 NOTE — TELEPHONE ENCOUNTER
Future appt:    Last Appointment with provider:   12/23/2021 for follow up, cellulitis  Last appointment at [facility]:  12/23/2021  Cholesterol, Total (mg/dL)   Date Value   03/18/2021 168     HDL Cholesterol (mg/dL)   Date Value   03/18/2021 36 (L)

## 2022-01-12 ENCOUNTER — ANTI-COAG VISIT (OUTPATIENT)
Dept: FAMILY MEDICINE CLINIC | Facility: CLINIC | Age: 62
End: 2022-01-12

## 2022-01-12 DIAGNOSIS — Z79.01 LONG TERM (CURRENT) USE OF ANTICOAGULANTS: ICD-10-CM

## 2022-01-12 LAB
INR: 3.1
PT: 29 SEC (ref 9–11.5)

## 2022-01-12 NOTE — PROGRESS NOTES
Please inform patient that his INR is slightly elevated at 3.1. Recommend to continue with current dose of 5 mg every Monday, Thursday and 2.5 mg all other days. Recheck on 2/9/2022.

## 2022-01-19 ENCOUNTER — TELEPHONE (OUTPATIENT)
Dept: FAMILY MEDICINE CLINIC | Facility: CLINIC | Age: 62
End: 2022-01-19

## 2022-01-19 DIAGNOSIS — G47.33 OSA (OBSTRUCTIVE SLEEP APNEA): Primary | ICD-10-CM

## 2022-01-19 NOTE — TELEPHONE ENCOUNTER
LOV 10/5/21 sleep follow up  Patients machine is older than 11years old. Please place a new dme order for Ana.

## 2022-01-19 NOTE — TELEPHONE ENCOUNTER
using CPAP for appx 5 yrs  - same equipment. sees us every 6 months    wondering what is the protocol to obtain new equipment     please advise / call back       No future appointments.

## 2022-01-25 ENCOUNTER — TELEPHONE (OUTPATIENT)
Dept: FAMILY MEDICINE CLINIC | Facility: CLINIC | Age: 62
End: 2022-01-25

## 2022-01-25 DIAGNOSIS — Z12.11 COLON CANCER SCREENING: Primary | ICD-10-CM

## 2022-01-25 NOTE — TELEPHONE ENCOUNTER
Pt states that he has never had a colonoscopy, wants to know what the step is to schedule onw. States that he has no sxs but would like to have one due to his age. Would like a call back.

## 2022-02-11 RX ORDER — ENALAPRIL MALEATE 20 MG/1
20 TABLET ORAL 2 TIMES DAILY
Qty: 180 TABLET | Refills: 0 | Status: SHIPPED | OUTPATIENT
Start: 2022-02-11

## 2022-02-11 RX ORDER — ENALAPRIL MALEATE 20 MG/1
20 TABLET ORAL 2 TIMES DAILY
Qty: 180 TABLET | Refills: 0 | Status: SHIPPED | OUTPATIENT
Start: 2022-02-11 | End: 2022-02-16

## 2022-02-14 ENCOUNTER — TELEPHONE (OUTPATIENT)
Dept: FAMILY MEDICINE CLINIC | Facility: CLINIC | Age: 62
End: 2022-02-14

## 2022-02-14 NOTE — TELEPHONE ENCOUNTER
Spoke to pt stated that he has blisters on his right foot. Pt mentioned before has had this before and blisters get bigger and eventually join together to big blister. Painful to step on. Mostly just bottom of foot. Some have popped, mostly clear liquid, couple with some faint amount of blood in it. Pt has cleaned are and used neosporin and Vaseline.    Pt recently treated for cellulitis for right leg  appt made  Future Appointments   Date Time Provider Kimberly Zhu   2/16/2022  4:00 PM Ross Smith MD [facility]     Please advise

## 2022-02-14 NOTE — TELEPHONE ENCOUNTER
Agree with appointment. Recommend to continue to use Neosporin. Go to ER if increase in blister size, redness or pain. Isotretinoin Counseling: Patient should get monthly blood tests, not donate blood, not drive at night if vision affected, not share medication, and not undergo elective surgery for 6 months after tx completed. Side effects reviewed, pt to contact office should one occur. Spironolactone Pregnancy And Lactation Text: This medication can cause feminization of the male fetus and should be avoided during pregnancy. The active metabolite is also found in breast milk. Isotretinoin Pregnancy And Lactation Text: This medication is Pregnancy Category X and is considered extremely dangerous during pregnancy. It is unknown if it is excreted in breast milk. Detail Level: Zone Tetracycline Counseling: Patient counseled regarding possible photosensitivity and increased risk for sunburn.  Patient instructed to avoid sunlight, if possible.  When exposed to sunlight, patients should wear protective clothing, sunglasses, and sunscreen.  The patient was instructed to call the office immediately if the following severe adverse effects occur:  hearing changes, easy bruising/bleeding, severe headache, or vision changes.  The patient verbalized understanding of the proper use and possible adverse effects of tetracycline.  All of the patient's questions and concerns were addressed. Patient understands to avoid pregnancy while on therapy due to potential birth defects. Benzoyl Peroxide Counseling: Patient counseled that medicine may cause skin irritation and bleach clothing.  In the event of skin irritation, the patient was advised to reduce the amount of the drug applied or use it less frequently.   The patient verbalized understanding of the proper use and possible adverse effects of benzoyl peroxide.  All of the patient's questions and concerns were addressed. Topical Retinoid Pregnancy And Lactation Text: This medication is Pregnancy Category C. It is unknown if this medication is excreted in breast milk. Erythromycin Pregnancy And Lactation Text: This medication is Pregnancy Category B and is considered safe during pregnancy. It is also excreted in breast milk. Topical Retinoid counseling:  Patient advised to apply a pea-sized amount only at bedtime and wait 30 minutes after washing their face before applying.  If too drying, patient may add a non-comedogenic moisturizer. The patient verbalized understanding of the proper use and possible adverse effects of retinoids.  All of the patient's questions and concerns were addressed. Sarecycline Pregnancy And Lactation Text: This medication is Pregnancy Category D and not consider safe during pregnancy. It is also excreted in breast milk. Topical Clindamycin Counseling: Patient counseled that this medication may cause skin irritation or allergic reactions.  In the event of skin irritation, the patient was advised to reduce the amount of the drug applied or use it less frequently.   The patient verbalized understanding of the proper use and possible adverse effects of clindamycin.  All of the patient's questions and concerns were addressed. Spironolactone Counseling: Patient advised regarding risks of diarrhea, abdominal pain, hyperkalemia, birth defects (for female patients), liver toxicity and renal toxicity. The patient may need blood work to monitor liver and kidney function and potassium levels while on therapy. The patient verbalized understanding of the proper use and possible adverse effects of spironolactone.  All of the patient's questions and concerns were addressed. High Dose Vitamin A Pregnancy And Lactation Text: High dose vitamin A therapy is contraindicated during pregnancy and breast feeding. Sarecycline Counseling: Patient advised regarding possible photosensitivity and discoloration of the teeth, skin, lips, tongue and gums.  Patient instructed to avoid sunlight, if possible.  When exposed to sunlight, patients should wear protective clothing, sunglasses, and sunscreen.  The patient was instructed to call the office immediately if the following severe adverse effects occur:  hearing changes, easy bruising/bleeding, severe headache, or vision changes.  The patient verbalized understanding of the proper use and possible adverse effects of sarecycline.  All of the patient's questions and concerns were addressed. Erythromycin Counseling:  I discussed with the patient the risks of erythromycin including but not limited to GI upset, allergic reaction, drug rash, diarrhea, increase in liver enzymes, and yeast infections. Dapsone Pregnancy And Lactation Text: This medication is Pregnancy Category C and is not considered safe during pregnancy or breast feeding. Doxycycline Pregnancy And Lactation Text: This medication is Pregnancy Category D and not consider safe during pregnancy. It is also excreted in breast milk but is considered safe for shorter treatment courses. Use Enhanced Medication Counseling?: No Topical Clindamycin Pregnancy And Lactation Text: This medication is Pregnancy Category B and is considered safe during pregnancy. It is unknown if it is excreted in breast milk. High Dose Vitamin A Counseling: Side effects reviewed, pt to contact office should one occur. Doxycycline Counseling:  Patient counseled regarding possible photosensitivity and increased risk for sunburn.  Patient instructed to avoid sunlight, if possible.  When exposed to sunlight, patients should wear protective clothing, sunglasses, and sunscreen.  The patient was instructed to call the office immediately if the following severe adverse effects occur:  hearing changes, easy bruising/bleeding, severe headache, or vision changes.  The patient verbalized understanding of the proper use and possible adverse effects of doxycycline.  All of the patient's questions and concerns were addressed. Minocycline Counseling: Patient advised regarding possible photosensitivity and discoloration of the teeth, skin, lips, tongue and gums.  Patient instructed to avoid sunlight, if possible.  When exposed to sunlight, patients should wear protective clothing, sunglasses, and sunscreen.  The patient was instructed to call the office immediately if the following severe adverse effects occur:  hearing changes, easy bruising/bleeding, severe headache, or vision changes.  The patient verbalized understanding of the proper use and possible adverse effects of minocycline.  All of the patient's questions and concerns were addressed. Birth Control Pills Pregnancy And Lactation Text: This medication should be avoided if pregnant and for the first 30 days post-partum. Dapsone Counseling: I discussed with the patient the risks of dapsone including but not limited to hemolytic anemia, agranulocytosis, rashes, methemoglobinemia, kidney failure, peripheral neuropathy, headaches, GI upset, and liver toxicity.  Patients who start dapsone require monitoring including baseline LFTs and weekly CBCs for the first month, then every month thereafter.  The patient verbalized understanding of the proper use and possible adverse effects of dapsone.  All of the patient's questions and concerns were addressed. Tazorac Pregnancy And Lactation Text: This medication is not safe during pregnancy. It is unknown if this medication is excreted in breast milk. Birth Control Pills Counseling: Birth Control Pill Counseling: I discussed with the patient the potential side effects of OCPs including but not limited to increased risk of stroke, heart attack, thrombophlebitis, deep venous thrombosis, hepatic adenomas, breast changes, GI upset, headaches, and depression.  The patient verbalized understanding of the proper use and possible adverse effects of OCPs. All of the patient's questions and concerns were addressed. Benzoyl Peroxide Pregnancy And Lactation Text: This medication is Pregnancy Category C. It is unknown if benzoyl peroxide is excreted in breast milk. Topical Sulfur Applications Counseling: Topical Sulfur Counseling: Patient counseled that this medication may cause skin irritation or allergic reactions.  In the event of skin irritation, the patient was advised to reduce the amount of the drug applied or use it less frequently.   The patient verbalized understanding of the proper use and possible adverse effects of topical sulfur application.  All of the patient's questions and concerns were addressed. Tazorac Counseling:  Patient advised that medication is irritating and drying.  Patient may need to apply sparingly and wash off after an hour before eventually leaving it on overnight.  The patient verbalized understanding of the proper use and possible adverse effects of tazorac.  All of the patient's questions and concerns were addressed. Bactrim Pregnancy And Lactation Text: This medication is Pregnancy Category D and is known to cause fetal risk.  It is also excreted in breast milk. Azithromycin Pregnancy And Lactation Text: This medication is considered safe during pregnancy and is also secreted in breast milk. Azithromycin Counseling:  I discussed with the patient the risks of azithromycin including but not limited to GI upset, allergic reaction, drug rash, diarrhea, and yeast infections. Bactrim Counseling:  I discussed with the patient the risks of sulfa antibiotics including but not limited to GI upset, allergic reaction, drug rash, diarrhea, dizziness, photosensitivity, and yeast infections.  Rarely, more serious reactions can occur including but not limited to aplastic anemia, agranulocytosis, methemoglobinemia, blood dyscrasias, liver or kidney failure, lung infiltrates or desquamative/blistering drug rashes. Topical Sulfur Applications Pregnancy And Lactation Text: This medication is Pregnancy Category C and has an unknown safety profile during pregnancy. It is unknown if this topical medication is excreted in breast milk.

## 2022-02-16 ENCOUNTER — OFFICE VISIT (OUTPATIENT)
Dept: FAMILY MEDICINE CLINIC | Facility: CLINIC | Age: 62
End: 2022-02-16
Payer: MEDICAID

## 2022-02-16 VITALS
HEIGHT: 70 IN | OXYGEN SATURATION: 98 % | HEART RATE: 63 BPM | RESPIRATION RATE: 18 BRPM | BODY MASS INDEX: 44.12 KG/M2 | WEIGHT: 308.19 LBS | TEMPERATURE: 97 F | DIASTOLIC BLOOD PRESSURE: 86 MMHG | SYSTOLIC BLOOD PRESSURE: 120 MMHG

## 2022-02-16 DIAGNOSIS — Z79.01 LONG TERM (CURRENT) USE OF ANTICOAGULANTS: ICD-10-CM

## 2022-02-16 DIAGNOSIS — I48.91 ATRIAL FIBRILLATION, UNSPECIFIED TYPE (HCC): ICD-10-CM

## 2022-02-16 DIAGNOSIS — S90.821A BLISTER OF RIGHT FOOT, INITIAL ENCOUNTER: Primary | ICD-10-CM

## 2022-02-16 PROCEDURE — 3074F SYST BP LT 130 MM HG: CPT | Performed by: FAMILY MEDICINE

## 2022-02-16 PROCEDURE — 99214 OFFICE O/P EST MOD 30 MIN: CPT | Performed by: FAMILY MEDICINE

## 2022-02-16 PROCEDURE — 3079F DIAST BP 80-89 MM HG: CPT | Performed by: FAMILY MEDICINE

## 2022-02-16 PROCEDURE — 3008F BODY MASS INDEX DOCD: CPT | Performed by: FAMILY MEDICINE

## 2022-02-16 RX ORDER — SULFAMETHOXAZOLE AND TRIMETHOPRIM 800; 160 MG/1; MG/1
1 TABLET ORAL 2 TIMES DAILY
Qty: 20 TABLET | Refills: 0 | Status: SHIPPED | OUTPATIENT
Start: 2022-02-16

## 2022-02-16 RX ORDER — PRENATAL VIT 91/IRON/FOLIC/DHA 28-975-200
COMBINATION PACKAGE (EA) ORAL
COMMUNITY
End: 2022-02-16 | Stop reason: CLARIF

## 2022-02-16 RX ORDER — TERBINAFINE HYDROCHLORIDE 250 MG/1
250 TABLET ORAL DAILY
COMMUNITY

## 2022-02-16 NOTE — PATIENT INSTRUCTIONS
Continue current medications  Start bactrim  Use cream twice a day as needed   Keep appointment with podiatrist.   Return to clinic if any increase redness, pain, warmth, fever or oozing. [facility] on [facility].

## 2022-02-21 RX ORDER — DOXAZOSIN MESYLATE 4 MG/1
4 TABLET ORAL DAILY
Qty: 30 TABLET | Refills: 5 | Status: SHIPPED | OUTPATIENT
Start: 2022-02-21

## 2022-02-21 NOTE — TELEPHONE ENCOUNTER
Future appt:    Last Appointment with provider:   4 months ago MD Leana Espinoza 24, [address] Class 3 severe obesity due to excess calories with serious comorbidity and body mass index (BMI) of 40.0 to 44.9 in adult [facility])       Last appointment at [facility]:  2/16/2022  Cholesterol, Total (mg/dL)   Date Value   03/18/2021 168     HDL Cholesterol (mg/dL)   Date Value   03/18/2021 36 (L)     LDL Cholesterol (mg/dL)   Date Value   03/18/2021 115 (H)     Triglycerides (mg/dL)   Date Value   03/18/2021 87     Lab Results   Component Value Date     03/18/2021    A1C 5.9 (H) 03/18/2021     Lab Results   Component Value Date    TSH 1.960 03/18/2021       No follow-ups on file.

## 2022-03-01 ENCOUNTER — ANTI-COAG VISIT (OUTPATIENT)
Dept: FAMILY MEDICINE CLINIC | Facility: CLINIC | Age: 62
End: 2022-03-01

## 2022-03-01 LAB
INR: 3.6
PT: 31.9 SEC (ref 9–11.5)

## 2022-03-01 NOTE — PROGRESS NOTES
Please inform patient that his INR is high. Recommend to hold coumadin for today and tomorrow. Restart taking at 2.5 mg on Thursday.    Recheck on 3/8/22

## 2022-03-09 ENCOUNTER — ANTI-COAG VISIT (OUTPATIENT)
Dept: FAMILY MEDICINE CLINIC | Facility: CLINIC | Age: 62
End: 2022-03-09

## 2022-03-09 LAB
INR: 1.8
PT: 17.2 SEC (ref 9–11.5)

## 2022-03-09 NOTE — PROGRESS NOTES
Informed pt of results and recommendations.  Pt agrees to plan and will schedule lab appointment with [employer].

## 2022-03-09 NOTE — PROGRESS NOTES
Please inform patient that his INR is slightly low at 1.8. Recommend Coumadin 5 mg every Wednesday and 2.5 mg all other days. Recheck on 3/16/2022.

## 2022-03-17 ENCOUNTER — ANTI-COAG VISIT (OUTPATIENT)
Dept: FAMILY MEDICINE CLINIC | Facility: CLINIC | Age: 62
End: 2022-03-17

## 2022-03-17 LAB
INR: 1.9
PT: 17.8 SEC (ref 9–11.5)

## 2022-03-17 NOTE — PROGRESS NOTES
Spoke to the pt verbalized understanding of INR results, dosage, and next test date.    No further questions

## 2022-03-17 NOTE — PROGRESS NOTES
Please inform patient that his INR is slightly low. Recommend 5 mg every Wednesday, Friday and 2.5 mg all other days.   Recheck 3/31/2022

## 2022-03-31 ENCOUNTER — ANTI-COAG VISIT (OUTPATIENT)
Dept: FAMILY MEDICINE CLINIC | Facility: CLINIC | Age: 62
End: 2022-03-31

## 2022-03-31 LAB
INR: 2.8
PT: 25.3 SEC (ref 9–11.5)

## 2022-03-31 NOTE — PROGRESS NOTES
Spoke to pt verbalized understanding of INR results, dosage, and next test date.       No further questions

## 2022-03-31 NOTE — PROGRESS NOTES
Please inform patient that his INR is in range. Recommend to continue with 5 mg every Wednesday, Friday and 2.5 mg all other days.   Recheck on 4/13/2022

## 2022-04-14 ENCOUNTER — ANTI-COAG VISIT (OUTPATIENT)
Dept: FAMILY MEDICINE CLINIC | Facility: CLINIC | Age: 62
End: 2022-04-14

## 2022-04-14 LAB
INR: 2.4
PT: 22 SEC (ref 9–11.5)

## 2022-04-14 NOTE — PROGRESS NOTES
Please inform patient that his INR is in range. Recommend to continue with 5 mg every Wednesday, Friday and 2.5 mg all other days. Recheck on 5/11/2022.

## 2022-04-14 NOTE — PROGRESS NOTES
Spoke to pt verbalized understanding of INR results, dosage and next test date.     No further questions

## 2022-04-18 RX ORDER — FUROSEMIDE 40 MG/1
40 TABLET ORAL DAILY
Qty: 90 TABLET | Refills: 0 | Status: SHIPPED | OUTPATIENT
Start: 2022-04-18

## 2022-04-18 NOTE — TELEPHONE ENCOUNTER
Future appt: Your appointments     Date & Time Appointment Department [facility])    May 31, 2022  9:00 AM CDT Physical - Established with Karla Chacko MD [address] [facility] [address] ([facility])            [address] [facility] [id number]  [phone]        Last Appointment with provider:   2/16/2022  Last appointment at [facility] [place]:  2/16/2022  Cholesterol, Total (mg/dL)   Date Value   03/18/2021 168     HDL Cholesterol (mg/dL)   Date Value   03/18/2021 36 (L)     LDL Cholesterol (mg/dL)   Date Value   03/18/2021 115 (H)     Triglycerides (mg/dL)   Date Value   03/18/2021 87     Lab Results   Component Value Date     03/18/2021    A1C 5.9 (H) 03/18/2021     Lab Results   Component Value Date    TSH 1.960 03/18/2021       No follow-ups on file.

## 2022-04-18 NOTE — TELEPHONE ENCOUNTER
Pt calling for a refill on Furosamide to be sent to [facility] in [place]. States he is out of this rx - please advise.

## 2022-05-31 ENCOUNTER — OFFICE VISIT (OUTPATIENT)
Dept: FAMILY MEDICINE CLINIC | Facility: CLINIC | Age: 62
End: 2022-05-31
Payer: MEDICAID

## 2022-05-31 ENCOUNTER — LAB ENCOUNTER (OUTPATIENT)
Dept: LAB | Age: 62
End: 2022-05-31
Attending: FAMILY MEDICINE
Payer: MEDICAID

## 2022-05-31 VITALS
SYSTOLIC BLOOD PRESSURE: 122 MMHG | DIASTOLIC BLOOD PRESSURE: 82 MMHG | HEIGHT: 69.5 IN | TEMPERATURE: 98 F | BODY MASS INDEX: 45.61 KG/M2 | WEIGHT: 315 LBS | HEART RATE: 96 BPM | OXYGEN SATURATION: 97 % | RESPIRATION RATE: 18 BRPM

## 2022-05-31 DIAGNOSIS — R73.03 PREDIABETES: ICD-10-CM

## 2022-05-31 DIAGNOSIS — Z00.00 PHYSICAL EXAM: Primary | ICD-10-CM

## 2022-05-31 DIAGNOSIS — I10 ESSENTIAL HYPERTENSION: ICD-10-CM

## 2022-05-31 DIAGNOSIS — Z00.00 PHYSICAL EXAM: ICD-10-CM

## 2022-05-31 DIAGNOSIS — I48.91 ATRIAL FIBRILLATION, UNSPECIFIED TYPE (HCC): ICD-10-CM

## 2022-05-31 DIAGNOSIS — E66.01 CLASS 3 SEVERE OBESITY DUE TO EXCESS CALORIES WITH SERIOUS COMORBIDITY AND BODY MASS INDEX (BMI) OF 40.0 TO 44.9 IN ADULT (HCC): ICD-10-CM

## 2022-05-31 DIAGNOSIS — I10 PRIMARY HYPERTENSION: ICD-10-CM

## 2022-05-31 LAB
ALBUMIN SERPL-MCNC: 3.5 G/DL (ref 3.4–5)
ALBUMIN/GLOB SERPL: 0.8 {RATIO} (ref 1–2)
ALP LIVER SERPL-CCNC: 68 U/L
ALT SERPL-CCNC: 29 U/L
ANION GAP SERPL CALC-SCNC: 4 MMOL/L (ref 0–18)
AST SERPL-CCNC: 21 U/L (ref 15–37)
BASOPHILS # BLD AUTO: 0.05 X10(3) UL (ref 0–0.2)
BASOPHILS NFR BLD AUTO: 0.7 %
BILIRUB SERPL-MCNC: 0.4 MG/DL (ref 0.1–2)
BILIRUB UR QL STRIP.AUTO: NEGATIVE
BUN BLD-MCNC: 11 MG/DL (ref 7–18)
CALCIUM BLD-MCNC: 8.7 MG/DL (ref 8.5–10.1)
CHLORIDE SERPL-SCNC: 107 MMOL/L (ref 98–112)
CHOLEST SERPL-MCNC: 153 MG/DL (ref ?–200)
CLARITY UR REFRACT.AUTO: CLEAR
CO2 SERPL-SCNC: 25 MMOL/L (ref 21–32)
COLOR UR AUTO: YELLOW
COMPLEXED PSA SERPL-MCNC: 0.62 NG/ML (ref ?–4)
CREAT BLD-MCNC: 1.25 MG/DL
EOSINOPHIL # BLD AUTO: 0.28 X10(3) UL (ref 0–0.7)
EOSINOPHIL NFR BLD AUTO: 3.7 %
ERYTHROCYTE [DISTWIDTH] IN BLOOD BY AUTOMATED COUNT: 15.9 %
EST. AVERAGE GLUCOSE BLD GHB EST-MCNC: 126 MG/DL (ref 68–126)
FASTING PATIENT LIPID ANSWER: YES
FASTING STATUS PATIENT QL REPORTED: YES
GLOBULIN PLAS-MCNC: 4.3 G/DL (ref 2.8–4.4)
GLUCOSE BLD-MCNC: 106 MG/DL (ref 70–99)
GLUCOSE UR STRIP.AUTO-MCNC: NEGATIVE MG/DL
HBA1C MFR BLD: 6 % (ref ?–5.7)
HCT VFR BLD AUTO: 44.7 %
HDLC SERPL-MCNC: 32 MG/DL (ref 40–59)
HGB BLD-MCNC: 14.7 G/DL
IMM GRANULOCYTES # BLD AUTO: 0.02 X10(3) UL (ref 0–1)
IMM GRANULOCYTES NFR BLD: 0.3 %
INR BLD: 1.81 (ref 0.8–1.2)
KETONES UR STRIP.AUTO-MCNC: NEGATIVE MG/DL
LDLC SERPL CALC-MCNC: 103 MG/DL (ref ?–100)
LEUKOCYTE ESTERASE UR QL STRIP.AUTO: NEGATIVE
LYMPHOCYTES # BLD AUTO: 1.61 X10(3) UL (ref 1–4)
LYMPHOCYTES NFR BLD AUTO: 21.6 %
MCH RBC QN AUTO: 30.9 PG (ref 26–34)
MCHC RBC AUTO-ENTMCNC: 32.9 G/DL (ref 31–37)
MCV RBC AUTO: 94.1 FL
MONOCYTES # BLD AUTO: 0.95 X10(3) UL (ref 0.1–1)
MONOCYTES NFR BLD AUTO: 12.7 %
NEUTROPHILS # BLD AUTO: 4.56 X10 (3) UL (ref 1.5–7.7)
NEUTROPHILS # BLD AUTO: 4.56 X10(3) UL (ref 1.5–7.7)
NEUTROPHILS NFR BLD AUTO: 61 %
NITRITE UR QL STRIP.AUTO: NEGATIVE
NONHDLC SERPL-MCNC: 121 MG/DL (ref ?–130)
OSMOLALITY SERPL CALC.SUM OF ELEC: 282 MOSM/KG (ref 275–295)
PH UR STRIP.AUTO: 5 [PH] (ref 5–8)
PLATELET # BLD AUTO: 300 10(3)UL (ref 150–450)
POTASSIUM SERPL-SCNC: 4.1 MMOL/L (ref 3.5–5.1)
PROT SERPL-MCNC: 7.8 G/DL (ref 6.4–8.2)
PROT UR STRIP.AUTO-MCNC: NEGATIVE MG/DL
PROTHROMBIN TIME: 21 SECONDS (ref 11.6–14.8)
RBC # BLD AUTO: 4.75 X10(6)UL
RBC UR QL AUTO: NEGATIVE
SODIUM SERPL-SCNC: 136 MMOL/L (ref 136–145)
SP GR UR STRIP.AUTO: 1.01 (ref 1–1.03)
TRIGL SERPL-MCNC: 98 MG/DL (ref 30–149)
TSI SER-ACNC: 2.99 MIU/ML (ref 0.36–3.74)
UROBILINOGEN UR STRIP.AUTO-MCNC: <2 MG/DL
VLDLC SERPL CALC-MCNC: 16 MG/DL (ref 0–30)
WBC # BLD AUTO: 7.5 X10(3) UL (ref 4–11)

## 2022-05-31 PROCEDURE — 3074F SYST BP LT 130 MM HG: CPT | Performed by: FAMILY MEDICINE

## 2022-05-31 PROCEDURE — 85610 PROTHROMBIN TIME: CPT

## 2022-05-31 PROCEDURE — 99396 PREV VISIT EST AGE 40-64: CPT | Performed by: FAMILY MEDICINE

## 2022-05-31 PROCEDURE — 81003 URINALYSIS AUTO W/O SCOPE: CPT

## 2022-05-31 PROCEDURE — 84443 ASSAY THYROID STIM HORMONE: CPT

## 2022-05-31 PROCEDURE — 83036 HEMOGLOBIN GLYCOSYLATED A1C: CPT

## 2022-05-31 PROCEDURE — 3079F DIAST BP 80-89 MM HG: CPT | Performed by: FAMILY MEDICINE

## 2022-05-31 PROCEDURE — 36415 COLL VENOUS BLD VENIPUNCTURE: CPT

## 2022-05-31 PROCEDURE — 3008F BODY MASS INDEX DOCD: CPT | Performed by: FAMILY MEDICINE

## 2022-05-31 PROCEDURE — 80061 LIPID PANEL: CPT

## 2022-05-31 PROCEDURE — 80053 COMPREHEN METABOLIC PANEL: CPT

## 2022-05-31 PROCEDURE — 85025 COMPLETE CBC W/AUTO DIFF WBC: CPT

## 2022-05-31 RX ORDER — CHLORAL HYDRATE 500 MG
1 CAPSULE ORAL 2 TIMES DAILY
COMMUNITY

## 2022-05-31 NOTE — PATIENT INSTRUCTIONS
Continue current medications  Blood pressure stable. Advice low salt diet and exercise. Monitor your blood pressure. Return to clinic if systolic blood pressure more than 655 or diastolic more than 583. Check labs today.    Recommend healthy diet, exercise and weight loss    Continue to see podiatrist.

## 2022-06-01 ENCOUNTER — ANTI-COAG VISIT (OUTPATIENT)
Dept: FAMILY MEDICINE CLINIC | Facility: CLINIC | Age: 62
End: 2022-06-01

## 2022-06-01 ENCOUNTER — TELEPHONE (OUTPATIENT)
Dept: FAMILY MEDICINE CLINIC | Facility: CLINIC | Age: 62
End: 2022-06-01

## 2022-06-01 DIAGNOSIS — Z79.01 LONG TERM (CURRENT) USE OF ANTICOAGULANTS: ICD-10-CM

## 2022-06-01 NOTE — PROGRESS NOTES
Please inform patient that his INR is low at 1.81. Recommend 5 mg every Monday, Wednesday, Friday and 2.5 mg all other days. Recheck on 6/14/2022.

## 2022-06-01 NOTE — PROGRESS NOTES
Spoke to pt verbalized understanding of INR results, dosage change and next test date.   No further questions

## 2022-06-01 NOTE — TELEPHONE ENCOUNTER
----- Message from Alan Delarosa MD sent at 6/1/2022 11:34 AM CDT -----  Please inform patient that his hemoglobin A1c is 6.0, it has gone up slightly from last time, remains in the prediabetes range. LDL cholesterol is 103, it has improved from last time, remains very slightly elevated, triglycerides and total cholesterol is normal.Rest of labs are okay. Recommend low-cholesterol diet, low-carb diet, exercise and fish oil supplement. Also please look at anticoagulation visit for instruction on Coumadin.

## 2022-06-01 NOTE — PROGRESS NOTES
Spoke to pt appt made    Future Appointments   Date Time Provider Kimberly Zhu   6/14/2022 10:00 AM REF [place] [facility]

## 2022-06-06 ENCOUNTER — TELEPHONE (OUTPATIENT)
Dept: FAMILY MEDICINE CLINIC | Facility: CLINIC | Age: 62
End: 2022-06-06

## 2022-06-06 NOTE — TELEPHONE ENCOUNTER
switched to [facility] for CPAP supplies, could we please send orders?

## 2022-06-07 NOTE — TELEPHONE ENCOUNTER
MELISSA for pt. Faxed CMN to [facility]..  Asked pt to follow up 30 days after receiving new machine or in Oct.

## 2022-06-14 ENCOUNTER — ANTI-COAG VISIT (OUTPATIENT)
Dept: FAMILY MEDICINE CLINIC | Facility: CLINIC | Age: 62
End: 2022-06-14

## 2022-06-14 ENCOUNTER — LAB ENCOUNTER (OUTPATIENT)
Dept: LAB | Age: 62
End: 2022-06-14
Attending: FAMILY MEDICINE
Payer: MEDICAID

## 2022-06-14 DIAGNOSIS — I48.91 ATRIAL FIBRILLATION, UNSPECIFIED TYPE (HCC): ICD-10-CM

## 2022-06-14 DIAGNOSIS — Z79.01 LONG TERM (CURRENT) USE OF ANTICOAGULANTS: ICD-10-CM

## 2022-06-14 LAB
INR BLD: 2.12 (ref 0.8–1.2)
PROTHROMBIN TIME: 23.9 SECONDS (ref 11.6–14.8)

## 2022-06-14 PROCEDURE — 36415 COLL VENOUS BLD VENIPUNCTURE: CPT

## 2022-06-14 PROCEDURE — 85610 PROTHROMBIN TIME: CPT

## 2022-06-14 NOTE — PROGRESS NOTES
Please inform patient that INR is in range. Recommend to continue with 5 mg every Monday, Wednesday, Friday and 2.5 mg all other days. Recheck on 6/28/2022.

## 2022-06-16 ENCOUNTER — PATIENT OUTREACH (OUTPATIENT)
Dept: FAMILY MEDICINE CLINIC | Facility: CLINIC | Age: 62
End: 2022-06-16

## 2022-06-27 RX ORDER — METOPROLOL TARTRATE 50 MG/1
50 TABLET, FILM COATED ORAL 2 TIMES DAILY
Qty: 60 TABLET | Refills: 5 | Status: SHIPPED | OUTPATIENT
Start: 2022-06-27

## 2022-06-27 NOTE — TELEPHONE ENCOUNTER
Future appt:     Last Appointment with provider:   5/31/2022; Return in about 6 months (around 11/30/2022) for follow up. Last appointment at [facility] [place]:  5/31/2022  Cholesterol, Total (mg/dL)   Date Value   05/31/2022 153     HDL Cholesterol (mg/dL)   Date Value   05/31/2022 32 (L)     LDL Cholesterol (mg/dL)   Date Value   05/31/2022 103 (H)     Triglycerides (mg/dL)   Date Value   05/31/2022 98     Lab Results   Component Value Date     05/31/2022    A1C 6.0 (H) 05/31/2022     Lab Results   Component Value Date    TSH 2.990 05/31/2022     Last RF:  12/28/2021    No follow-ups on file.

## 2022-07-01 ENCOUNTER — ANTI-COAG VISIT (OUTPATIENT)
Dept: FAMILY MEDICINE CLINIC | Facility: CLINIC | Age: 62
End: 2022-07-01

## 2022-07-01 DIAGNOSIS — Z79.01 LONG TERM (CURRENT) USE OF ANTICOAGULANTS: ICD-10-CM

## 2022-07-01 LAB
INR: 2.5
PT: 23.1 SEC (ref 9–11.5)

## 2022-07-01 NOTE — PROGRESS NOTES
Spoke to pt verbalized understanding of results, dosage and next test date    appt made    Future Appointments   Date Time Provider Kimberly Zhu   7/13/2022  3:30 PM [facility] [place]   7/27/2022 10:15 AM REF [place] [facility]F [facility]

## 2022-07-01 NOTE — PROGRESS NOTES
Please inform patient that his INR is in range. Recommend to continue with 5 mg every Monday, Wednesday, Friday and 2.5 mg all other days. Recheck on 7/27/2022.

## 2022-07-05 RX ORDER — DILTIAZEM HYDROCHLORIDE 60 MG/1
60 TABLET, FILM COATED ORAL 2 TIMES DAILY
Qty: 180 TABLET | Refills: 0 | Status: SHIPPED | OUTPATIENT
Start: 2022-07-05

## 2022-07-05 RX ORDER — FUROSEMIDE 40 MG/1
40 TABLET ORAL DAILY
Qty: 90 TABLET | Refills: 0 | Status: SHIPPED | OUTPATIENT
Start: 2022-07-05

## 2022-07-05 RX ORDER — DILTIAZEM HYDROCHLORIDE 60 MG/1
60 TABLET, FILM COATED ORAL 2 TIMES DAILY
Qty: 180 TABLET | Refills: 1 | Status: SHIPPED | OUTPATIENT
Start: 2022-07-05

## 2022-07-05 NOTE — TELEPHONE ENCOUNTER
Diltiazem: 12/28/21  Furosemide: 4/18/22       Return in about 6 months (around 11/30/2022) for follow up. Future appt: Your appointments     Date & Time Appointment Department [facility])    Jul 13, 2022  3:30 PM CDT Sleep Follow Up with [address], [address], [address], [place] ([place] Manciar[place])        Jul 27, 2022 10:15 AM CDT Laboratory Visit with REF [facility] ([facility] [place])            [address] [facility]  [address] [phone]  [address] [facility]  [facility] Ref [facility] [place]  [place] [address] [id number]  [phone]        Last Appointment with provider:   5/31/2022  Last appointment at [facility] [place]:  5/31/2022  Cholesterol, Total (mg/dL)   Date Value   05/31/2022 153     HDL Cholesterol (mg/dL)   Date Value   05/31/2022 32 (L)     LDL Cholesterol (mg/dL)   Date Value   05/31/2022 103 (H)     Triglycerides (mg/dL)   Date Value   05/31/2022 98     Lab Results   Component Value Date     05/31/2022    A1C 6.0 (H) 05/31/2022     Lab Results   Component Value Date    TSH 2.990 05/31/2022       No follow-ups on file.

## 2022-07-05 NOTE — TELEPHONE ENCOUNTER
Diltiazem: 12/28/21     Return in about 6 months (around 11/30/2022) for follow up. Future appt: Your appointments     Date & Time Appointment Department [facility])    Jul 13, 2022  3:30 PM CDT Sleep Follow Up with [address] [address], [facility], [address], [facility] ([facility])        Jul 27, 2022 10:15 AM CDT Laboratory Visit with REF Bernard Perry [facility] ([facility] Ref [facility])            [address] [facility]  [place] [phone]  [address] [facility]  ED[facility] Ref [facility] [place]  [address] [facility] [id number]  [phone]        Last Appointment with provider:   5/31/2022  Last appointment [facility] [place]:  5/31/2022  Cholesterol, Total (mg/dL)   Date Value   05/31/2022 153     HDL Cholesterol (mg/dL)   Date Value   05/31/2022 32 (L)     LDL Cholesterol (mg/dL)   Date Value   05/31/2022 103 (H)     Triglycerides (mg/dL)   Date Value   05/31/2022 98     Lab Results   Component Value Date     05/31/2022    A1C 6.0 (H) 05/31/2022     Lab Results   Component Value Date    TSH 2.990 05/31/2022       No follow-ups on file.

## 2022-07-13 ENCOUNTER — OFFICE VISIT (OUTPATIENT)
Dept: FAMILY MEDICINE CLINIC | Facility: CLINIC | Age: 62
End: 2022-07-13
Payer: MEDICAID

## 2022-07-13 VITALS
WEIGHT: 312 LBS | DIASTOLIC BLOOD PRESSURE: 70 MMHG | TEMPERATURE: 97 F | BODY MASS INDEX: 45.17 KG/M2 | SYSTOLIC BLOOD PRESSURE: 122 MMHG | OXYGEN SATURATION: 97 % | HEART RATE: 85 BPM | HEIGHT: 69.5 IN | RESPIRATION RATE: 18 BRPM

## 2022-07-13 DIAGNOSIS — G47.33 OSA (OBSTRUCTIVE SLEEP APNEA): Primary | ICD-10-CM

## 2022-07-13 PROCEDURE — 3008F BODY MASS INDEX DOCD: CPT | Performed by: NURSE PRACTITIONER

## 2022-07-13 PROCEDURE — 3074F SYST BP LT 130 MM HG: CPT | Performed by: NURSE PRACTITIONER

## 2022-07-13 PROCEDURE — 99214 OFFICE O/P EST MOD 30 MIN: CPT | Performed by: NURSE PRACTITIONER

## 2022-07-13 PROCEDURE — 3078F DIAST BP <80 MM HG: CPT | Performed by: NURSE PRACTITIONER

## 2022-07-27 ENCOUNTER — ANTI-COAG VISIT (OUTPATIENT)
Dept: FAMILY MEDICINE CLINIC | Facility: CLINIC | Age: 62
End: 2022-07-27

## 2022-07-27 ENCOUNTER — LAB ENCOUNTER (OUTPATIENT)
Dept: LAB | Age: 62
End: 2022-07-27
Attending: FAMILY MEDICINE
Payer: MEDICAID

## 2022-07-27 DIAGNOSIS — Z79.01 LONG TERM CURRENT USE OF ANTICOAGULANT THERAPY: ICD-10-CM

## 2022-07-27 DIAGNOSIS — I48.91 ATRIAL FIBRILLATION, UNSPECIFIED TYPE (HCC): ICD-10-CM

## 2022-07-27 LAB
INR BLD: 2.38 (ref 0.8–1.2)
PROTHROMBIN TIME: 26.1 SECONDS (ref 11.6–14.8)

## 2022-07-27 PROCEDURE — 36415 COLL VENOUS BLD VENIPUNCTURE: CPT

## 2022-07-27 PROCEDURE — 85610 PROTHROMBIN TIME: CPT

## 2022-07-27 NOTE — PROGRESS NOTES
Please inform patient that his INR is in range at 2.38. Recommend to continue with 5 mg every Monday, Wednesday, Friday and 2.5 mg all other days. Recheck on 8/24/2022.

## 2022-08-04 ENCOUNTER — TELEPHONE (OUTPATIENT)
Dept: FAMILY MEDICINE CLINIC | Facility: CLINIC | Age: 62
End: 2022-08-04

## 2022-08-04 DIAGNOSIS — Z79.01 LONG TERM (CURRENT) USE OF ANTICOAGULANTS: ICD-10-CM

## 2022-08-04 DIAGNOSIS — I48.0 PAROXYSMAL ATRIAL FIBRILLATION (HCC): Primary | ICD-10-CM

## 2022-08-04 NOTE — TELEPHONE ENCOUNTER
Patients standing order for PT with INR will  this month with [facility].   Please place new order for [facility]. Thank you

## 2022-08-08 DIAGNOSIS — I48.91 ATRIAL FIBRILLATION, UNSPECIFIED TYPE (HCC): ICD-10-CM

## 2022-08-09 RX ORDER — WARFARIN SODIUM 2.5 MG/1
TABLET ORAL
Qty: 90 TABLET | Refills: 5 | Status: SHIPPED | OUTPATIENT
Start: 2022-08-09

## 2022-08-09 NOTE — TELEPHONE ENCOUNTER
Future appt: Your appointments     Date & Time Appointment Department [facility])    Aug 24, 2022 10:15 AM CDT Laboratory Visit with REF Jaylan Martinez [facility] ([facility] Ref Lab [facility])            [facility]rina Sánchez Reference Lab  ED[facility] Ref Lab [place]  [address] [id number]  [phone]        Last Appointment with provider:   5/31/2022 Physical. Return in 6 months 11/30/22  Last appointment at [facility] [place]:  7/13/2022  Cholesterol, Total (mg/dL)   Date Value   05/31/2022 153     HDL Cholesterol (mg/dL)   Date Value   05/31/2022 32 (L)     LDL Cholesterol (mg/dL)   Date Value   05/31/2022 103 (H)     Triglycerides (mg/dL)   Date Value   05/31/2022 98     Lab Results   Component Value Date     05/31/2022    A1C 6.0 (H) 05/31/2022     Lab Results   Component Value Date    TSH 2.990 05/31/2022       No follow-ups on file.

## 2022-08-20 DIAGNOSIS — I10 HYPERTENSION, UNSPECIFIED TYPE: ICD-10-CM

## 2022-08-22 NOTE — TELEPHONE ENCOUNTER
pt called today, states he can not be without this medication for even a day- no longer has pills- is aware dr Serjio Bilss not in - is asking for another dr to fill

## 2022-08-22 NOTE — TELEPHONE ENCOUNTER
Future appt: Your appointments     Date & Time Appointment Department [facility])    Aug 24, 2022 10:15 AM CDT Laboratory Visit with REF Delcie Gottron [facility] ([facility] [place])            [facility]  ED[facility] Ref Lab [address] [address]          Last Appointment with provider:   5/31/2022; Return in about 6 months (around 11/30/2022) for follow up. Last appointment at [facility] [place]:  7/13/2022  Cholesterol, Total (mg/dL)   Date Value   05/31/2022 153     HDL Cholesterol (mg/dL)   Date Value   05/31/2022 32 (L)     LDL Cholesterol (mg/dL)   Date Value   05/31/2022 103 (H)     Triglycerides (mg/dL)   Date Value   05/31/2022 98     Lab Results   Component Value Date     05/31/2022    A1C 6.0 (H) 05/31/2022     Lab Results   Component Value Date    TSH 2.990 05/31/2022     Last RF:  2/11/2022 and 2/21/2022    No follow-ups on file.

## 2022-08-23 RX ORDER — DOXAZOSIN MESYLATE 4 MG/1
4 TABLET ORAL DAILY
Qty: 90 TABLET | Refills: 0 | Status: SHIPPED | OUTPATIENT
Start: 2022-08-23

## 2022-08-23 RX ORDER — ENALAPRIL MALEATE 20 MG/1
20 TABLET ORAL 2 TIMES DAILY
Qty: 180 TABLET | Refills: 0 | Status: SHIPPED | OUTPATIENT
Start: 2022-08-23

## 2022-09-02 ENCOUNTER — LABORATORY ENCOUNTER (OUTPATIENT)
Dept: LAB | Age: 62
End: 2022-09-02
Attending: FAMILY MEDICINE
Payer: MEDICAID

## 2022-09-02 DIAGNOSIS — Z79.01 LONG TERM (CURRENT) USE OF ANTICOAGULANTS: ICD-10-CM

## 2022-09-02 DIAGNOSIS — I48.0 PAROXYSMAL ATRIAL FIBRILLATION (HCC): ICD-10-CM

## 2022-09-02 LAB
INR BLD: 2.78 (ref 0.85–1.16)
PROTHROMBIN TIME: 29 SECONDS (ref 11.6–14.8)

## 2022-09-02 PROCEDURE — 36415 COLL VENOUS BLD VENIPUNCTURE: CPT

## 2022-09-02 PROCEDURE — 85610 PROTHROMBIN TIME: CPT

## 2022-09-06 ENCOUNTER — ANTI-COAG VISIT (OUTPATIENT)
Dept: FAMILY MEDICINE CLINIC | Facility: CLINIC | Age: 62
End: 2022-09-06

## 2022-09-06 DIAGNOSIS — Z79.01 LONG TERM (CURRENT) USE OF ANTICOAGULANTS: ICD-10-CM

## 2022-09-06 NOTE — PROGRESS NOTES
Please inform patient that his INR is in range at 2.78. Recommend to continue with 5 mg every Monday, Wednesday, Friday and 2.5 mg all other days. Recheck on 10/4/2022.

## 2022-10-10 ENCOUNTER — LABORATORY ENCOUNTER (OUTPATIENT)
Dept: LAB | Age: 62
End: 2022-10-10
Attending: FAMILY MEDICINE
Payer: MEDICAID

## 2022-10-10 ENCOUNTER — ANTI-COAG VISIT (OUTPATIENT)
Dept: FAMILY MEDICINE CLINIC | Facility: CLINIC | Age: 62
End: 2022-10-10

## 2022-10-10 DIAGNOSIS — Z79.01 LONG TERM (CURRENT) USE OF ANTICOAGULANTS: ICD-10-CM

## 2022-10-10 DIAGNOSIS — I48.0 PAROXYSMAL ATRIAL FIBRILLATION (HCC): ICD-10-CM

## 2022-10-10 LAB
INR BLD: 2.72 (ref 0.85–1.16)
PROTHROMBIN TIME: 28.5 SECONDS (ref 11.6–14.8)

## 2022-10-10 PROCEDURE — 36415 COLL VENOUS BLD VENIPUNCTURE: CPT

## 2022-10-10 PROCEDURE — 85610 PROTHROMBIN TIME: CPT

## 2022-10-10 NOTE — PROGRESS NOTES
Please inform patient that INR is in range at 2.72. Recommend to continue with 5 mg every Monday, Wednesday, Friday and 2.5 mg all other days. Recheck on 11/9/2022.

## 2022-10-10 NOTE — PROGRESS NOTES
Spoke to pt verbalized understanding of INR results, dosage and next test date    Future Appointments   Date Time Provider Kimberly Zhu   11/9/2022 11:30 AM REF [place] [facility]       No further questions

## 2022-10-24 RX ORDER — FUROSEMIDE 40 MG/1
40 TABLET ORAL DAILY
Qty: 90 TABLET | Refills: 0 | Status: SHIPPED | OUTPATIENT
Start: 2022-10-24

## 2022-10-24 RX ORDER — FUROSEMIDE 40 MG/1
TABLET ORAL
Qty: 90 TABLET | Refills: 0 | Status: SHIPPED | OUTPATIENT
Start: 2022-10-24

## 2022-10-24 NOTE — TELEPHONE ENCOUNTER
Furosemide: 7/5/22     Return in about 6 months (around 11/30/2022) for follow up. Future appt: Your appointments     Date & Time Appointment Department [facility])    Nov 09, 2022 11:30 AM CST Laboratory Visit with REF Marifer Medrano [facility] ([facility])            [facility]  ED[facility] Ref [facility] [place]  [address] [address] [id number]  [phone]        Last Appointment with provider:   5/31/2022  Last appointment at [facility] [place]:  7/13/2022  Cholesterol, Total (mg/dL)   Date Value   05/31/2022 153     HDL Cholesterol (mg/dL)   Date Value   05/31/2022 32 (L)     LDL Cholesterol (mg/dL)   Date Value   05/31/2022 103 (H)     Triglycerides (mg/dL)   Date Value   05/31/2022 98     Lab Results   Component Value Date     05/31/2022    A1C 6.0 (H) 05/31/2022     Lab Results   Component Value Date    TSH 2.990 05/31/2022       No follow-ups on file.

## 2022-10-24 NOTE — TELEPHONE ENCOUNTER
Furosemide: 7/5/22     Return in about 6 months (around 11/30/2022) for follow up. Future appt: Your appointments     Date & Time Appointment Department [facility])    Nov 09, 2022 11:30 AM CST Laboratory Visit with [facility] ([facility] [place])            [facility]  [facility] [place]  [id number]  [phone]        Last Appointment with provider:   5/31/2022  Last appointment at [facility] [place]:  7/13/2022  Cholesterol, Total (mg/dL)   Date Value   05/31/2022 153     HDL Cholesterol (mg/dL)   Date Value   05/31/2022 32 (L)     LDL Cholesterol (mg/dL)   Date Value   05/31/2022 103 (H)     Triglycerides (mg/dL)   Date Value   05/31/2022 98     Lab Results   Component Value Date     05/31/2022    A1C 6.0 (H) 05/31/2022     Lab Results   Component Value Date    TSH 2.990 05/31/2022       No follow-ups on file.

## 2022-11-09 ENCOUNTER — LABORATORY ENCOUNTER (OUTPATIENT)
Dept: LAB | Age: 62
End: 2022-11-09
Attending: FAMILY MEDICINE
Payer: MEDICAID

## 2022-11-09 DIAGNOSIS — Z79.01 LONG TERM (CURRENT) USE OF ANTICOAGULANTS: ICD-10-CM

## 2022-11-09 DIAGNOSIS — I48.0 PAROXYSMAL ATRIAL FIBRILLATION (HCC): ICD-10-CM

## 2022-11-09 LAB
INR BLD: 2.66 (ref 0.85–1.16)
PROTHROMBIN TIME: 28 SECONDS (ref 11.6–14.8)

## 2022-11-09 PROCEDURE — 85610 PROTHROMBIN TIME: CPT

## 2022-11-09 PROCEDURE — 36415 COLL VENOUS BLD VENIPUNCTURE: CPT

## 2022-11-10 ENCOUNTER — ANTI-COAG VISIT (OUTPATIENT)
Dept: FAMILY MEDICINE CLINIC | Facility: CLINIC | Age: 62
End: 2022-11-10

## 2022-11-10 DIAGNOSIS — Z79.01 LONG TERM (CURRENT) USE OF ANTICOAGULANTS: ICD-10-CM

## 2022-11-10 NOTE — PROGRESS NOTES
Left detailed message for pt with INR information    Advised pt to call back to confirm received message

## 2022-11-10 NOTE — PROGRESS NOTES
Please inform patient that INR is in range at 2.66. Recommend to continue with 5 mg every Monday, Wednesday, Friday and 2.5 mg all other days. Recheck on 12/7/2022.

## 2022-11-21 NOTE — TELEPHONE ENCOUNTER
Return in about 6 months (around 11/30/2022) for follow up. [facility]  Last Appointment with provider:   5/31/2022  Last appointment at [facility] [place]:  7/13/2022  Cholesterol, Total (mg/dL)   Date Value   05/31/2022 153     HDL Cholesterol (mg/dL)   Date Value   05/31/2022 32 (L)     LDL Cholesterol (mg/dL)   Date Value   05/31/2022 103 (H)     Triglycerides (mg/dL)   Date Value   05/31/2022 98     Lab Results   Component Value Date     05/31/2022    A1C 6.0 (H) 05/31/2022     Lab Results   Component Value Date    TSH 2.990 05/31/2022       No follow-ups on file.

## 2022-11-23 RX ORDER — DOXAZOSIN MESYLATE 4 MG/1
4 TABLET ORAL DAILY
Qty: 90 TABLET | Refills: 0 | Status: SHIPPED | OUTPATIENT
Start: 2022-11-23

## 2022-11-23 NOTE — TELEPHONE ENCOUNTER
appt made    Future Appointments   Date Time Provider Kimberly Zhu   12/9/2022  3:30 PM Latesha Hernandez MD [facility]

## 2022-12-09 ENCOUNTER — ANTI-COAG VISIT (OUTPATIENT)
Dept: FAMILY MEDICINE CLINIC | Facility: CLINIC | Age: 62
End: 2022-12-09

## 2022-12-09 ENCOUNTER — OFFICE VISIT (OUTPATIENT)
Dept: FAMILY MEDICINE CLINIC | Facility: CLINIC | Age: 62
End: 2022-12-09
Payer: MEDICAID

## 2022-12-09 VITALS
TEMPERATURE: 97 F | HEART RATE: 78 BPM | WEIGHT: 308.38 LBS | BODY MASS INDEX: 44.65 KG/M2 | OXYGEN SATURATION: 97 % | SYSTOLIC BLOOD PRESSURE: 116 MMHG | HEIGHT: 69.5 IN | RESPIRATION RATE: 18 BRPM | DIASTOLIC BLOOD PRESSURE: 82 MMHG

## 2022-12-09 DIAGNOSIS — Z79.01 LONG TERM (CURRENT) USE OF ANTICOAGULANTS: ICD-10-CM

## 2022-12-09 DIAGNOSIS — E66.01 CLASS 3 SEVERE OBESITY DUE TO EXCESS CALORIES WITH SERIOUS COMORBIDITY AND BODY MASS INDEX (BMI) OF 40.0 TO 44.9 IN ADULT (HCC): ICD-10-CM

## 2022-12-09 DIAGNOSIS — I10 HYPERTENSION, UNSPECIFIED TYPE: ICD-10-CM

## 2022-12-09 DIAGNOSIS — I48.91 ATRIAL FIBRILLATION, UNSPECIFIED TYPE (HCC): ICD-10-CM

## 2022-12-09 DIAGNOSIS — R73.03 PREDIABETES: ICD-10-CM

## 2022-12-09 DIAGNOSIS — I48.0 PAROXYSMAL ATRIAL FIBRILLATION (HCC): ICD-10-CM

## 2022-12-09 DIAGNOSIS — I10 ESSENTIAL HYPERTENSION: Primary | ICD-10-CM

## 2022-12-09 DIAGNOSIS — R60.9 PERIPHERAL EDEMA: ICD-10-CM

## 2022-12-09 PROBLEM — D68.69 OTHER THROMBOPHILIA (HCC): Status: ACTIVE | Noted: 2022-12-09

## 2022-12-09 LAB
CARTRIDGE EXPIRATION DATE: ABNORMAL DATE
CARTRIDGE LOT#: ABNORMAL NUMERIC
HEMOGLOBIN A1C: 5.8 % (ref 4.3–5.6)
INR: 3.9 (ref 0.8–1.2)
INR: 3.9 (ref 0.8–1.2)

## 2022-12-09 PROCEDURE — 3079F DIAST BP 80-89 MM HG: CPT | Performed by: FAMILY MEDICINE

## 2022-12-09 PROCEDURE — 83036 HEMOGLOBIN GLYCOSYLATED A1C: CPT | Performed by: FAMILY MEDICINE

## 2022-12-09 PROCEDURE — 3008F BODY MASS INDEX DOCD: CPT | Performed by: FAMILY MEDICINE

## 2022-12-09 PROCEDURE — 99214 OFFICE O/P EST MOD 30 MIN: CPT | Performed by: FAMILY MEDICINE

## 2022-12-09 PROCEDURE — 85610 PROTHROMBIN TIME: CPT | Performed by: FAMILY MEDICINE

## 2022-12-09 PROCEDURE — 3074F SYST BP LT 130 MM HG: CPT | Performed by: FAMILY MEDICINE

## 2022-12-09 RX ORDER — CALCIPOTRIENE 50 UG/G
1 OINTMENT TOPICAL 2 TIMES DAILY
COMMUNITY
Start: 2022-09-07

## 2022-12-09 RX ORDER — ENALAPRIL MALEATE 20 MG/1
20 TABLET ORAL 2 TIMES DAILY
Qty: 180 TABLET | Refills: 1 | Status: SHIPPED | OUTPATIENT
Start: 2022-12-09

## 2022-12-09 RX ORDER — METOPROLOL TARTRATE 50 MG/1
50 TABLET, FILM COATED ORAL 2 TIMES DAILY
Qty: 180 TABLET | Refills: 1 | Status: SHIPPED | OUTPATIENT
Start: 2022-12-09

## 2022-12-09 RX ORDER — FUROSEMIDE 40 MG/1
40 TABLET ORAL DAILY
Qty: 90 TABLET | Refills: 1 | Status: SHIPPED | OUTPATIENT
Start: 2022-12-09

## 2022-12-09 RX ORDER — WARFARIN SODIUM 2.5 MG/1
TABLET ORAL
Qty: 90 TABLET | Refills: 5 | Status: SHIPPED | OUTPATIENT
Start: 2022-12-09

## 2022-12-09 RX ORDER — DILTIAZEM HYDROCHLORIDE 60 MG/1
60 TABLET, FILM COATED ORAL 2 TIMES DAILY
Qty: 180 TABLET | Refills: 1 | Status: SHIPPED | OUTPATIENT
Start: 2022-12-09

## 2022-12-09 RX ORDER — DOXAZOSIN MESYLATE 4 MG/1
4 TABLET ORAL DAILY
Qty: 90 TABLET | Refills: 1 | Status: SHIPPED | OUTPATIENT
Start: 2022-12-09

## 2022-12-09 NOTE — PROGRESS NOTES
Patient was informed of results in office. Recommend to hold Coumadin for today. Recommend to recheck INR tomorrow.

## 2022-12-09 NOTE — PATIENT INSTRUCTIONS
Continue current medications   Blood pressure stable today. Advice low salt diet and exercise. Monitor your blood pressure. Return to clinic if systolic blood pressure more than 524 or diastolic more than 537. Continue with INR check. Monitor for any abnormal bleeding or bruising. Edema stable with medication. Advice low carb in diet, AVOID FOOD WITH HIGH GLYCEMIC INDEX, have smaller portion meal, avoid bread, pasta and potatoes, no juice or soda, don't eat late at night, exercise,  and weight loss. Advice low cholesterol diet and exercise. May use fish oil supplement. Recommend flu shot. INR 3.9 today. Hold coumadin for today. Recheck INR tomorrow. Recommend Tdap booster.

## 2022-12-09 NOTE — PROGRESS NOTES
INR 3.9    Current Coumadin 5mg on M/W/F and 2.5mg all other days of the week. Per Dr. Lupis Nicole, will order lab draw to confirm today. Please advise if any further instruction.

## 2022-12-10 ENCOUNTER — ANTI-COAG VISIT (OUTPATIENT)
Dept: FAMILY MEDICINE CLINIC | Facility: CLINIC | Age: 62
End: 2022-12-10
Payer: MEDICAID

## 2022-12-10 DIAGNOSIS — Z79.01 LONG TERM (CURRENT) USE OF ANTICOAGULANTS: ICD-10-CM

## 2022-12-10 LAB — INR: 3.4 (ref 0.8–1.2)

## 2022-12-10 NOTE — PROGRESS NOTES
Please inform patient that INR is improving, still slightly elevated at 3.4. Recommend 5 mg every Monday Friday and 2.5 mg all other days. Recheck on 12/13/2022.

## 2022-12-13 ENCOUNTER — ANTI-COAG VISIT (OUTPATIENT)
Dept: FAMILY MEDICINE CLINIC | Facility: CLINIC | Age: 62
End: 2022-12-13
Payer: MEDICAID

## 2022-12-13 ENCOUNTER — TELEPHONE (OUTPATIENT)
Dept: FAMILY MEDICINE CLINIC | Facility: CLINIC | Age: 62
End: 2022-12-13

## 2022-12-13 DIAGNOSIS — Z79.01 LONG TERM (CURRENT) USE OF ANTICOAGULANTS: ICD-10-CM

## 2022-12-13 LAB — INR: 2.7 (ref 0.8–1.2)

## 2022-12-13 PROCEDURE — 85610 PROTHROMBIN TIME: CPT | Performed by: FAMILY MEDICINE

## 2022-12-13 NOTE — TELEPHONE ENCOUNTER
Please inform patient I had a chance to review paper that he dropped off and I will not be able to prescribe Psorizide Forte. Currently I do not have any training or expertise with homeopathic medication. I recommend to see homeopathic physician for treatment option.     Dr Brandie Hoover Physician  [place], [address] [facility]  Phone: [phone]

## 2022-12-13 NOTE — PROGRESS NOTES
Please inform patient that INR is in range at 2.7. Recommend to continue with 5 mg every Monday, Friday and 2.5 mg all other days. Recheck on 12/27/2022.

## 2022-12-16 DIAGNOSIS — I48.91 ATRIAL FIBRILLATION, UNSPECIFIED TYPE (HCC): ICD-10-CM

## 2022-12-16 NOTE — TELEPHONE ENCOUNTER
Pharmacy requesting new order to reflect patient is taking 9 tablets of 2.5mg weekly at this time. Pended below. Return in about 6 months (around 6/9/2023) for physical.  Future appt: Your appointments     Date & Time Appointment Department [facility])    Dec 27, 2022  1:45 PM CST Anticoagulation Visit with Doug Ma ([facility] [place])            [address] [facility] [place]  [facility] [id number]  [phone]        Last Appointment with provider:   12/9/2022  Last appointment at [facility]:  12/9/2022  Cholesterol, Total (mg/dL)   Date Value   05/31/2022 153     HDL Cholesterol (mg/dL)   Date Value   05/31/2022 32 (L)     LDL Cholesterol (mg/dL)   Date Value   05/31/2022 103 (H)     Triglycerides (mg/dL)   Date Value   05/31/2022 98     Lab Results   Component Value Date     05/31/2022    A1C 5.8 (A) 12/09/2022     Lab Results   Component Value Date    TSH 2.990 05/31/2022       No follow-ups on file.

## 2022-12-18 RX ORDER — WARFARIN SODIUM 2.5 MG/1
TABLET ORAL
Qty: 90 TABLET | Refills: 5 | Status: SHIPPED | OUTPATIENT
Start: 2022-12-18

## 2022-12-27 ENCOUNTER — ANTI-COAG VISIT (OUTPATIENT)
Dept: FAMILY MEDICINE CLINIC | Facility: CLINIC | Age: 62
End: 2022-12-27
Payer: MEDICAID

## 2022-12-27 DIAGNOSIS — Z79.01 LONG TERM (CURRENT) USE OF ANTICOAGULANTS: ICD-10-CM

## 2022-12-27 LAB — INR: 3.2 (ref 0.8–1.2)

## 2022-12-27 PROCEDURE — 85610 PROTHROMBIN TIME: CPT | Performed by: FAMILY MEDICINE

## 2022-12-27 NOTE — PROGRESS NOTES
Please inform patient that INR is slightly elevated at 3.2. Recommend Coumadin 5 mg every Monday and 2.5 mg all other days. Recheck on 1/10/2023.

## 2022-12-27 NOTE — PROGRESS NOTES
Patient informed of the below results and recommendations.      Future Appointments   Date Time Provider Kimberly Zhu   1/10/2023 11:00 AM [facility] [place] NURSE [facility] [place]

## 2023-01-10 ENCOUNTER — ANTI-COAG VISIT (OUTPATIENT)
Dept: FAMILY MEDICINE CLINIC | Facility: CLINIC | Age: 63
End: 2023-01-10
Payer: MEDICARE

## 2023-01-10 DIAGNOSIS — Z79.01 LONG TERM (CURRENT) USE OF ANTICOAGULANTS: ICD-10-CM

## 2023-01-10 LAB — INR: 2.5 (ref 0.8–1.2)

## 2023-01-10 PROCEDURE — 85610 PROTHROMBIN TIME: CPT | Performed by: FAMILY MEDICINE

## 2023-01-10 NOTE — PROGRESS NOTES
Please inform patient that INR is in range at 2.5. Recommend to continue with 5 mg every Monday and 2.5 mg all other days.   Recheck on 2/7/2023

## 2023-01-10 NOTE — PROGRESS NOTES
Spoke to pt verbalized understsndaind of INR results, dosage and next test date.   Pt can not do the 7th- will come in on the 8th    No further questions     appt made    Future Appointments   Date Time Provider Kimberly Zhu   2/8/2023 11:15 AM [facility] [place] NURSE [facility] [place]

## 2023-01-10 NOTE — PROGRESS NOTES
Pt in office for INR check, current dosing correct with pt 5 mg mondays and 2.5 mg rest of the days of the week    2.5    Please advise

## 2023-02-08 ENCOUNTER — ANTI-COAG VISIT (OUTPATIENT)
Dept: FAMILY MEDICINE CLINIC | Facility: CLINIC | Age: 63
End: 2023-02-08
Payer: MEDICARE

## 2023-02-08 DIAGNOSIS — Z79.01 LONG TERM (CURRENT) USE OF ANTICOAGULANTS: ICD-10-CM

## 2023-02-08 LAB — INR: 3.2 (ref 0.8–1.2)

## 2023-02-08 NOTE — PROGRESS NOTES
Pt in office for INR Check  Confirmed 5 mg on Mondays and 2.5 mg all rest of the days of the week      3.2    Please advise

## 2023-02-08 NOTE — PROGRESS NOTES
Spoke to pt verbalized understanding of INR results, dosage and next test date.     No further questions, appt made    Future Appointments   Date Time Provider Kimberly Zhu   2/22/2023 11:15 AM [facility] [place] NURSE [facility] [place] [place]

## 2023-02-08 NOTE — PROGRESS NOTES
Please inform patient that his INR is slightly elevated at 3.2. Recommend to cut back on Coumadin to 2.5 mg daily. Recheck on 2/22/2023.

## 2023-02-16 ENCOUNTER — TELEPHONE (OUTPATIENT)
Dept: FAMILY MEDICINE CLINIC | Facility: CLINIC | Age: 63
End: 2023-02-16

## 2023-02-16 NOTE — TELEPHONE ENCOUNTER
Luke Betancourt is requesting a new CPAP prescription and office notes stating continued usage and benefit of cpap therapy. Patient new to Medicare. Sent patient a [employer] message to schedule an appointment.

## 2023-02-22 ENCOUNTER — OFFICE VISIT (OUTPATIENT)
Dept: FAMILY MEDICINE CLINIC | Facility: CLINIC | Age: 63
End: 2023-02-22
Payer: MEDICARE

## 2023-02-22 ENCOUNTER — ANTI-COAG VISIT (OUTPATIENT)
Dept: FAMILY MEDICINE CLINIC | Facility: CLINIC | Age: 63
End: 2023-02-22
Payer: MEDICARE

## 2023-02-22 VITALS
BODY MASS INDEX: 45.03 KG/M2 | SYSTOLIC BLOOD PRESSURE: 142 MMHG | HEART RATE: 91 BPM | HEIGHT: 69.5 IN | WEIGHT: 311 LBS | DIASTOLIC BLOOD PRESSURE: 84 MMHG | TEMPERATURE: 98 F | OXYGEN SATURATION: 95 % | RESPIRATION RATE: 18 BRPM

## 2023-02-22 DIAGNOSIS — G47.33 OSA (OBSTRUCTIVE SLEEP APNEA): Primary | ICD-10-CM

## 2023-02-22 DIAGNOSIS — Z79.01 LONG TERM (CURRENT) USE OF ANTICOAGULANTS: ICD-10-CM

## 2023-02-22 LAB — INR: 2.8 (ref 0.8–1.2)

## 2023-02-22 PROCEDURE — 85610 PROTHROMBIN TIME: CPT | Performed by: FAMILY MEDICINE

## 2023-02-22 PROCEDURE — 99214 OFFICE O/P EST MOD 30 MIN: CPT | Performed by: NURSE PRACTITIONER

## 2023-02-22 NOTE — PROGRESS NOTES
In office INR 2.8. Patient takes 2.5 mg daily  No changes to diet or medication. Patient is interested in starting at home monitoring.

## 2023-02-22 NOTE — PROGRESS NOTES
Please inform patient that INR is in range. Recommend to continue with 2.5 mg daily. Recheck on 3/8/2023.

## 2023-03-01 ENCOUNTER — TELEPHONE (OUTPATIENT)
Dept: FAMILY MEDICINE CLINIC | Facility: CLINIC | Age: 63
End: 2023-03-01

## 2023-03-01 NOTE — TELEPHONE ENCOUNTER
Recommend ice pack, use Tylenol as needed. Schedule appointment with available provider for tomorrow. Recommend to go to emergency room today if having any numbness tingling or weakness of arm.

## 2023-03-01 NOTE — TELEPHONE ENCOUNTER
Spoke to pt last Friday carrying gallon containers of water and lifted his right hand above his head with 2 of the containers in his hand. Pt stated that he felt pain in his right shoulder. he did not feel a \"pop\". Pain is a 3-4 on pan scale resting. Pt is not able to raise his arm on his own, if uses other arm to help raise he can but pain is intense \"at a 11\" on the pain scale. Ok to bend elbow, pain is in the shoulder.    Tylenol is not helping for pain much    Please advise

## 2023-03-01 NOTE — TELEPHONE ENCOUNTER
Spoke to pt verbalized understanding of recommendations. No further questions.     appt made    Future Appointments   Date Time Provider Kimberly Zhu   3/2/2023  1:30 PM BABAK Guardado [facility] [place] [facility]   3/8/2023 11:15 AM [facility] [place] NURSE [facility] [place] [place]

## 2023-03-02 ENCOUNTER — TELEPHONE (OUTPATIENT)
Dept: FAMILY MEDICINE CLINIC | Facility: CLINIC | Age: 63
End: 2023-03-02

## 2023-03-02 ENCOUNTER — OFFICE VISIT (OUTPATIENT)
Dept: FAMILY MEDICINE CLINIC | Facility: CLINIC | Age: 63
End: 2023-03-02
Payer: MEDICARE

## 2023-03-02 VITALS
RESPIRATION RATE: 19 BRPM | HEIGHT: 69 IN | OXYGEN SATURATION: 98 % | HEART RATE: 95 BPM | SYSTOLIC BLOOD PRESSURE: 121 MMHG | TEMPERATURE: 98 F | BODY MASS INDEX: 45.77 KG/M2 | DIASTOLIC BLOOD PRESSURE: 74 MMHG | WEIGHT: 309 LBS

## 2023-03-02 DIAGNOSIS — M25.511 ACUTE PAIN OF RIGHT SHOULDER: Primary | ICD-10-CM

## 2023-03-02 PROCEDURE — 99214 OFFICE O/P EST MOD 30 MIN: CPT | Performed by: NURSE PRACTITIONER

## 2023-03-02 RX ORDER — METHYLPREDNISOLONE 4 MG/1
TABLET ORAL
Qty: 1 EACH | Refills: 0 | Status: SHIPPED | OUTPATIENT
Start: 2023-03-02

## 2023-03-02 RX ORDER — ACETAMINOPHEN 500 MG
500 TABLET ORAL EVERY 6 HOURS PRN
COMMUNITY

## 2023-03-02 RX ORDER — CETIRIZINE HYDROCHLORIDE 10 MG/1
10 TABLET ORAL AS NEEDED
COMMUNITY

## 2023-03-02 NOTE — TELEPHONE ENCOUNTER
Spoke to [facility] INR monitoring. Missing face sheet for pt.     Informed would fax    No further questions

## 2023-03-02 NOTE — PATIENT INSTRUCTIONS
Ice to shoulder, biofreeze     Take medrol as directed     Follow up for INR on Monday 3/6     Follow up with physical therapy

## 2023-03-06 ENCOUNTER — ANTI-COAG VISIT (OUTPATIENT)
Dept: FAMILY MEDICINE CLINIC | Facility: CLINIC | Age: 63
End: 2023-03-06
Payer: MEDICARE

## 2023-03-06 DIAGNOSIS — Z79.01 LONG TERM (CURRENT) USE OF ANTICOAGULANTS: ICD-10-CM

## 2023-03-06 DIAGNOSIS — I48.0 PAROXYSMAL ATRIAL FIBRILLATION (HCC): Primary | ICD-10-CM

## 2023-03-06 LAB — INR: 2.8 (ref 0.8–1.2)

## 2023-03-06 NOTE — PROGRESS NOTES
Spoke to pt informed that order will be faxed and will mail copy to pt to take with    No further questions

## 2023-03-06 NOTE — PROGRESS NOTES
Pt in office for INR check    2.8    Pt started a medrol pack on 3/2/23 for shoulder pain, saw GONSALO Arreola NP       Please advise

## 2023-03-06 NOTE — PROGRESS NOTES
Please inform patient that his INR is in range at 2.8. Recommend to continue with 2.5 mg daily. Also recommend to recheck on 3/15/2023 due to current use of steroid. Recommend to call back if any unusual bleeding or bruising.

## 2023-03-06 NOTE — PROGRESS NOTES
Spoke to pt verbalized understanding of INR results, dosage and next test date. Pt will be in [place] at time of needed INR, pt has done INR in [place] at [place] in years past.    Updated new order for INR through [address] [place] [address].     Please advise

## 2023-03-15 ENCOUNTER — TELEPHONE (OUTPATIENT)
Dept: FAMILY MEDICINE CLINIC | Facility: CLINIC | Age: 63
End: 2023-03-15

## 2023-03-15 NOTE — TELEPHONE ENCOUNTER
Spoke to pt informed that he is due for his INR today. Pt unable to do INR today, pt will do tomorrow. Pt got his machine from [employer] and will be doing INR that way tomorrow and will call with results as well.

## 2023-03-16 ENCOUNTER — ANTI-COAG VISIT (OUTPATIENT)
Dept: FAMILY MEDICINE CLINIC | Facility: CLINIC | Age: 63
End: 2023-03-16

## 2023-03-16 DIAGNOSIS — Z79.01 LONG TERM (CURRENT) USE OF ANTICOAGULANTS: ICD-10-CM

## 2023-03-16 LAB — INR: 2.9 (ref 0.8–1.2)

## 2023-03-16 NOTE — PROGRESS NOTES
Please inform patient that INR is in range at 2.9. Recommend to continue with Coumadin 2.5 mg daily. Recheck on 3/30/2023.

## 2023-03-30 ENCOUNTER — ANTI-COAG VISIT (OUTPATIENT)
Dept: FAMILY MEDICINE CLINIC | Facility: CLINIC | Age: 63
End: 2023-03-30

## 2023-03-30 ENCOUNTER — TELEPHONE (OUTPATIENT)
Dept: FAMILY MEDICINE CLINIC | Facility: CLINIC | Age: 63
End: 2023-03-30

## 2023-03-30 DIAGNOSIS — Z79.01 LONG TERM (CURRENT) USE OF ANTICOAGULANTS: ICD-10-CM

## 2023-03-30 DIAGNOSIS — I48.91 ATRIAL FIBRILLATION, UNSPECIFIED TYPE (HCC): ICD-10-CM

## 2023-03-30 LAB — INR: 2.4 (ref 0.8–1.2)

## 2023-03-30 NOTE — PROGRESS NOTES
Spoke to pt verbalized understanding of INR results, dosage and next test date    No further questions

## 2023-03-30 NOTE — PROGRESS NOTES
Please inform patient that his INR is in range at 2.4. Recommend to continue with 2.5 mg daily. Recheck on 4/27/2023.

## 2023-04-04 ENCOUNTER — TELEPHONE (OUTPATIENT)
Dept: FAMILY MEDICINE CLINIC | Facility: CLINIC | Age: 63
End: 2023-04-04

## 2023-04-04 NOTE — TELEPHONE ENCOUNTER
Received request to send 01/01/21 to present medical records to [address], [address], [place], [facility].  Sent to [facility] as Urgent/Stat

## 2023-04-06 ENCOUNTER — TELEPHONE (OUTPATIENT)
Dept: FAMILY MEDICINE CLINIC | Facility: CLINIC | Age: 63
End: 2023-04-06

## 2023-04-06 NOTE — TELEPHONE ENCOUNTER
Requesting medical records for  A fib, ekgs    Call back [phone]- Sommer [place] calling from [address]  Dr. Marcia Winters

## 2023-04-11 ENCOUNTER — TELEPHONE (OUTPATIENT)
Dept: FAMILY MEDICINE CLINIC | Facility: CLINIC | Age: 63
End: 2023-04-11

## 2023-04-11 NOTE — TELEPHONE ENCOUNTER
Patient is having problem with right foot. Patient stated he had the same problem a year ago. Patient is in [place] right now.

## 2023-04-11 NOTE — TELEPHONE ENCOUNTER
Spoke to pt, informed that we can not treat any condition out of state. Pt stated that his foot is swollen and has a rash. Pt had blisters on the right foot to start, blisters popped and skin near it is pink and swollen. Pt advised to go to urgent care to have foot evaluated and treatment. Pt agreed to plan.

## 2023-04-27 ENCOUNTER — ANTI-COAG VISIT (OUTPATIENT)
Dept: FAMILY MEDICINE CLINIC | Facility: CLINIC | Age: 63
End: 2023-04-27

## 2023-04-27 DIAGNOSIS — Z79.01 LONG TERM (CURRENT) USE OF ANTICOAGULANTS: ICD-10-CM

## 2023-04-27 LAB — INR: 2.3 (ref 0.8–1.2)

## 2023-04-27 PROCEDURE — 93793 ANTICOAG MGMT PT WARFARIN: CPT | Performed by: FAMILY MEDICINE

## 2023-04-27 NOTE — PROGRESS NOTES
Please inform patient that INR is in range at 2.3. Recommend to continue with Coumadin 2.5 mg every day. Recheck on 5/25/2023.

## 2023-05-25 ENCOUNTER — ANTI-COAG VISIT (OUTPATIENT)
Dept: FAMILY MEDICINE CLINIC | Facility: CLINIC | Age: 63
End: 2023-05-25

## 2023-05-25 DIAGNOSIS — Z79.01 LONG TERM (CURRENT) USE OF ANTICOAGULANTS: ICD-10-CM

## 2023-05-25 LAB — INR: 2.7 (ref 0.8–1.2)

## 2023-05-25 PROCEDURE — 93793 ANTICOAG MGMT PT WARFARIN: CPT | Performed by: FAMILY MEDICINE

## 2023-05-25 NOTE — PROGRESS NOTES
Please inform patient that INR is in range at 2.7. Recommend to continue with 2.5 mg every day. Recheck on 6/22/2023.

## 2023-06-22 ENCOUNTER — ANTI-COAG VISIT (OUTPATIENT)
Dept: FAMILY MEDICINE CLINIC | Facility: CLINIC | Age: 63
End: 2023-06-22

## 2023-06-22 DIAGNOSIS — Z79.01 LONG TERM (CURRENT) USE OF ANTICOAGULANTS: ICD-10-CM

## 2023-06-22 DIAGNOSIS — I48.11 LONGSTANDING PERSISTENT ATRIAL FIBRILLATION (HCC): ICD-10-CM

## 2023-06-22 LAB — INR: 2.5 (ref 0.8–1.2)

## 2023-06-22 PROCEDURE — 93793 ANTICOAG MGMT PT WARFARIN: CPT

## 2023-06-22 NOTE — PROGRESS NOTES
INR within therapeutic range. Continue current dosage and current frequency of his Coumadin. Recheck INR in 1 month.

## 2023-06-22 NOTE — PROGRESS NOTES
Spoke to pt verbalized understanding of INR results, dosage, and next test date.      Pt stated that he started on a new medication:   cosentyx 300mg 1x week for 3 weeks started 1 week ago prescribed by Dr. Terry Valderrama will see him again on the 29th       No further questions

## 2023-06-22 NOTE — TELEPHONE ENCOUNTER
My chart message sent for appt      Last appt 12/9/22 advised Return in about 6 months (around 6/9/2023) for physical.      Future Appointments   Date Time Provider Kimberly Zhu   6/29/2023  9:00 AM Sancho Edwards MD [facility] [address]

## 2023-06-26 RX ORDER — METOPROLOL TARTRATE 50 MG/1
50 TABLET, FILM COATED ORAL 2 TIMES DAILY
Qty: 180 TABLET | Refills: 1 | OUTPATIENT
Start: 2023-06-26

## 2023-06-26 RX ORDER — METOPROLOL TARTRATE 50 MG/1
50 TABLET, FILM COATED ORAL 2 TIMES DAILY
Qty: 180 TABLET | Refills: 0 | Status: SHIPPED | OUTPATIENT
Start: 2023-06-26

## 2023-06-26 NOTE — TELEPHONE ENCOUNTER
Appt made    Future Appointments   Date Time Provider Kimberly Audra   6/29/2023  9:00 AM Atiya Coelho MD [facility] [place]   8/17/2023  9:00 AM Abbie Buenrostro MD [facility] [place] [facility]

## 2023-07-03 RX ORDER — DILTIAZEM HYDROCHLORIDE 60 MG/1
60 TABLET, FILM COATED ORAL 2 TIMES DAILY
Qty: 180 TABLET | Refills: 0 | Status: SHIPPED | OUTPATIENT
Start: 2023-07-03

## 2023-07-21 ENCOUNTER — ANTI-COAG VISIT (OUTPATIENT)
Dept: FAMILY MEDICINE CLINIC | Facility: CLINIC | Age: 63
End: 2023-07-21

## 2023-07-21 ENCOUNTER — TELEPHONE (OUTPATIENT)
Dept: FAMILY MEDICINE CLINIC | Facility: CLINIC | Age: 63
End: 2023-07-21

## 2023-07-21 DIAGNOSIS — Z79.01 LONG TERM (CURRENT) USE OF ANTICOAGULANTS: ICD-10-CM

## 2023-07-21 DIAGNOSIS — I48.11 LONGSTANDING PERSISTENT ATRIAL FIBRILLATION (HCC): Primary | ICD-10-CM

## 2023-07-21 LAB — INR: 2.2 (ref 2–3)

## 2023-07-21 PROCEDURE — 93793 ANTICOAG MGMT PT WARFARIN: CPT

## 2023-07-21 NOTE — PROGRESS NOTES
1.  Patient's INR 2.2, patient is therapeutic level. Continue taking Coumadin at current dose and frequency. Recheck INR in 1 month.

## 2023-08-07 DIAGNOSIS — I10 HYPERTENSION, UNSPECIFIED TYPE: ICD-10-CM

## 2023-08-07 RX ORDER — ENALAPRIL MALEATE 20 MG/1
20 TABLET ORAL 2 TIMES DAILY
Qty: 180 TABLET | Refills: 0 | Status: SHIPPED | OUTPATIENT
Start: 2023-08-07

## 2023-08-07 NOTE — TELEPHONE ENCOUNTER
Last appt 12/9/22 advised  Return in about 6 months (around 6/9/2023) for physical.      Future Appointments   Date Time Provider Kimberly Audra   8/9/2023 11:00 AM Maryfrances Phalen, APRN [facility] [place] [facility] [place]   8/17/2023  9:00 AM Cristela Cooper MD [facility] [place] [facility] [place]   9/28/2023 10:30 AM Adrian Mares MD [facility] [address]

## 2023-08-09 ENCOUNTER — OFFICE VISIT (OUTPATIENT)
Dept: FAMILY MEDICINE CLINIC | Facility: CLINIC | Age: 63
End: 2023-08-09
Payer: MEDICARE

## 2023-08-09 VITALS
OXYGEN SATURATION: 97 % | SYSTOLIC BLOOD PRESSURE: 114 MMHG | WEIGHT: 315 LBS | DIASTOLIC BLOOD PRESSURE: 62 MMHG | RESPIRATION RATE: 18 BRPM | HEART RATE: 100 BPM | HEIGHT: 69 IN | BODY MASS INDEX: 46.65 KG/M2 | TEMPERATURE: 97 F

## 2023-08-09 DIAGNOSIS — G47.33 OBSTRUCTIVE SLEEP APNEA: Primary | ICD-10-CM

## 2023-08-09 DIAGNOSIS — R53.83 FATIGUE, UNSPECIFIED TYPE: ICD-10-CM

## 2023-08-09 PROCEDURE — 99214 OFFICE O/P EST MOD 30 MIN: CPT | Performed by: NURSE PRACTITIONER

## 2023-08-09 RX ORDER — SECUKINUMAB 150 MG/ML
1 INJECTION SUBCUTANEOUS
COMMUNITY
Start: 2023-06-15

## 2023-08-17 ENCOUNTER — OFFICE VISIT (OUTPATIENT)
Dept: FAMILY MEDICINE CLINIC | Facility: CLINIC | Age: 63
End: 2023-08-17
Payer: MEDICARE

## 2023-08-17 ENCOUNTER — LAB ENCOUNTER (OUTPATIENT)
Dept: LAB | Age: 63
End: 2023-08-17
Attending: FAMILY MEDICINE
Payer: MEDICAID

## 2023-08-17 VITALS
RESPIRATION RATE: 20 BRPM | WEIGHT: 315 LBS | DIASTOLIC BLOOD PRESSURE: 72 MMHG | BODY MASS INDEX: 46.65 KG/M2 | TEMPERATURE: 98 F | HEIGHT: 69 IN | OXYGEN SATURATION: 98 % | SYSTOLIC BLOOD PRESSURE: 124 MMHG | HEART RATE: 67 BPM

## 2023-08-17 DIAGNOSIS — I25.10 CORONARY ARTERY DISEASE INVOLVING NATIVE HEART WITHOUT ANGINA PECTORIS, UNSPECIFIED VESSEL OR LESION TYPE: ICD-10-CM

## 2023-08-17 DIAGNOSIS — Z11.59 NEED FOR HEPATITIS C SCREENING TEST: ICD-10-CM

## 2023-08-17 DIAGNOSIS — E66.01 CLASS 3 SEVERE OBESITY DUE TO EXCESS CALORIES WITHOUT SERIOUS COMORBIDITY WITH BODY MASS INDEX (BMI) OF 45.0 TO 49.9 IN ADULT (HCC): ICD-10-CM

## 2023-08-17 DIAGNOSIS — Z12.5 PROSTATE CANCER SCREENING: ICD-10-CM

## 2023-08-17 DIAGNOSIS — I48.11 LONGSTANDING PERSISTENT ATRIAL FIBRILLATION (HCC): ICD-10-CM

## 2023-08-17 DIAGNOSIS — Z13.1 SCREENING FOR DIABETES MELLITUS (DM): ICD-10-CM

## 2023-08-17 DIAGNOSIS — S90.821A BLISTER (NONTHERMAL), RIGHT FOOT, INITIAL ENCOUNTER: ICD-10-CM

## 2023-08-17 DIAGNOSIS — Z13.6 SCREENING FOR CARDIOVASCULAR CONDITION: ICD-10-CM

## 2023-08-17 DIAGNOSIS — Z00.00 ENCOUNTER FOR ANNUAL HEALTH EXAMINATION: ICD-10-CM

## 2023-08-17 DIAGNOSIS — R60.0 BILATERAL LEG EDEMA: ICD-10-CM

## 2023-08-17 DIAGNOSIS — I10 HYPERTENSION, UNSPECIFIED TYPE: ICD-10-CM

## 2023-08-17 DIAGNOSIS — I10 ESSENTIAL HYPERTENSION: ICD-10-CM

## 2023-08-17 DIAGNOSIS — Z00.00 ENCOUNTER FOR MEDICARE ANNUAL WELLNESS EXAM: ICD-10-CM

## 2023-08-17 DIAGNOSIS — R73.03 PREDIABETES: ICD-10-CM

## 2023-08-17 DIAGNOSIS — Z00.00 ENCOUNTER FOR MEDICARE ANNUAL WELLNESS EXAM: Primary | ICD-10-CM

## 2023-08-17 LAB
ALBUMIN SERPL-MCNC: 3.7 G/DL (ref 3.4–5)
ALBUMIN/GLOB SERPL: 1 {RATIO} (ref 1–2)
ALP LIVER SERPL-CCNC: 64 U/L
ALT SERPL-CCNC: 38 U/L
ANION GAP SERPL CALC-SCNC: 5 MMOL/L (ref 0–18)
AST SERPL-CCNC: 27 U/L (ref 15–37)
ATRIAL RATE: 75 BPM
BASOPHILS # BLD AUTO: 0.04 X10(3) UL (ref 0–0.2)
BASOPHILS NFR BLD AUTO: 0.5 %
BILIRUB SERPL-MCNC: 0.7 MG/DL (ref 0.1–2)
BUN BLD-MCNC: 7 MG/DL (ref 7–18)
CALCIUM BLD-MCNC: 8.6 MG/DL (ref 8.5–10.1)
CHLORIDE SERPL-SCNC: 105 MMOL/L (ref 98–112)
CHOLEST SERPL-MCNC: 187 MG/DL (ref ?–200)
CO2 SERPL-SCNC: 29 MMOL/L (ref 21–32)
COMPLEXED PSA SERPL-MCNC: 0.81 NG/ML (ref ?–4)
CREAT BLD-MCNC: 1.07 MG/DL
EGFRCR SERPLBLD CKD-EPI 2021: 77 ML/MIN/1.73M2 (ref 60–?)
EOSINOPHIL # BLD AUTO: 0.24 X10(3) UL (ref 0–0.7)
EOSINOPHIL NFR BLD AUTO: 2.9 %
ERYTHROCYTE [DISTWIDTH] IN BLOOD BY AUTOMATED COUNT: 15.6 %
FASTING PATIENT LIPID ANSWER: YES
FASTING STATUS PATIENT QL REPORTED: YES
GLOBULIN PLAS-MCNC: 3.7 G/DL (ref 2.8–4.4)
GLUCOSE BLD-MCNC: 111 MG/DL (ref 70–99)
HCT VFR BLD AUTO: 47.9 %
HCV AB SERPL QL IA: NONREACTIVE
HDLC SERPL-MCNC: 40 MG/DL (ref 40–59)
HGB BLD-MCNC: 16.3 G/DL
IMM GRANULOCYTES # BLD AUTO: 0.02 X10(3) UL (ref 0–1)
IMM GRANULOCYTES NFR BLD: 0.2 %
LDLC SERPL CALC-MCNC: 128 MG/DL (ref ?–100)
LYMPHOCYTES # BLD AUTO: 1.88 X10(3) UL (ref 1–4)
LYMPHOCYTES NFR BLD AUTO: 23 %
MCH RBC QN AUTO: 30.7 PG (ref 26–34)
MCHC RBC AUTO-ENTMCNC: 34 G/DL (ref 31–37)
MCV RBC AUTO: 90.2 FL
MONOCYTES # BLD AUTO: 0.91 X10(3) UL (ref 0.1–1)
MONOCYTES NFR BLD AUTO: 11.1 %
NEUTROPHILS # BLD AUTO: 5.08 X10 (3) UL (ref 1.5–7.7)
NEUTROPHILS # BLD AUTO: 5.08 X10(3) UL (ref 1.5–7.7)
NEUTROPHILS NFR BLD AUTO: 62.3 %
NONHDLC SERPL-MCNC: 147 MG/DL (ref ?–130)
OSMOLALITY SERPL CALC.SUM OF ELEC: 287 MOSM/KG (ref 275–295)
PLATELET # BLD AUTO: 258 10(3)UL (ref 150–450)
POTASSIUM SERPL-SCNC: 3.5 MMOL/L (ref 3.5–5.1)
PROT SERPL-MCNC: 7.4 G/DL (ref 6.4–8.2)
Q-T INTERVAL: 390 MS
QRS DURATION: 98 MS
QTC CALCULATION (BEZET): 412 MS
R AXIS: 36 DEGREES
RBC # BLD AUTO: 5.31 X10(6)UL
SODIUM SERPL-SCNC: 139 MMOL/L (ref 136–145)
T AXIS: 170 DEGREES
TRIGL SERPL-MCNC: 105 MG/DL (ref 30–149)
TSI SER-ACNC: 3.55 MIU/ML (ref 0.36–3.74)
VENTRICULAR RATE: 67 BPM
VLDLC SERPL CALC-MCNC: 19 MG/DL (ref 0–30)
WBC # BLD AUTO: 8.2 X10(3) UL (ref 4–11)

## 2023-08-17 PROCEDURE — 86803 HEPATITIS C AB TEST: CPT

## 2023-08-17 PROCEDURE — 80053 COMPREHEN METABOLIC PANEL: CPT

## 2023-08-17 PROCEDURE — 83036 HEMOGLOBIN GLYCOSYLATED A1C: CPT

## 2023-08-17 PROCEDURE — G0403 EKG FOR INITIAL PREVENT EXAM: HCPCS | Performed by: FAMILY MEDICINE

## 2023-08-17 PROCEDURE — 99213 OFFICE O/P EST LOW 20 MIN: CPT | Performed by: FAMILY MEDICINE

## 2023-08-17 PROCEDURE — 85025 COMPLETE CBC W/AUTO DIFF WBC: CPT

## 2023-08-17 PROCEDURE — G0402 INITIAL PREVENTIVE EXAM: HCPCS | Performed by: FAMILY MEDICINE

## 2023-08-17 PROCEDURE — 80061 LIPID PANEL: CPT

## 2023-08-17 PROCEDURE — 36415 COLL VENOUS BLD VENIPUNCTURE: CPT

## 2023-08-17 PROCEDURE — 84443 ASSAY THYROID STIM HORMONE: CPT

## 2023-08-18 ENCOUNTER — PATIENT MESSAGE (OUTPATIENT)
Dept: FAMILY MEDICINE CLINIC | Facility: CLINIC | Age: 63
End: 2023-08-18

## 2023-08-18 LAB
EST. AVERAGE GLUCOSE BLD GHB EST-MCNC: 131 MG/DL (ref 68–126)
HBA1C MFR BLD: 6.2 % (ref ?–5.7)

## 2023-08-18 NOTE — TELEPHONE ENCOUNTER
From: Renu Martell. To: Dayana Colon MD  Sent: 8/18/2023 12:52 PM CDT  Subject: Starting on Eliquis today    I have discontinued warfarin and will start eliquis today. (My INR this morning was 2.2) Should I continue to take the low dose aspirin each day?

## 2023-08-21 RX ORDER — DOXAZOSIN MESYLATE 4 MG/1
4 TABLET ORAL DAILY
Qty: 90 TABLET | Refills: 1 | Status: SHIPPED | OUTPATIENT
Start: 2023-08-21

## 2023-08-21 NOTE — TELEPHONE ENCOUNTER
Last visit- 8/17/23, [address] exam  Last refill- 12/9/22      Future appt: Your appointments       Date & Time Appointment Department [facility])    Sep 12, 2023 10:40 AM CDT Follow Up Visit with Swati Mccurdy, [address], [place] ([facility] [place] [facility])        Sep 28, 2023 10:30 AM CDT Biologic with Reji Peter MD [address], [address] ([address])              [facility]  [facility] [place] [address]  [address] [facility] [address] [place] [address], [facility]  [address], [facility] [address]  [address] [facility]  [phone]          Last Appointment with provider:   8/17/2023  Last appointment at [facility] [place]:  8/17/2023  Cholesterol, Total (mg/dL)   Date Value   08/17/2023 187     HDL Cholesterol (mg/dL)   Date Value   08/17/2023 40     LDL Cholesterol (mg/dL)   Date Value   08/17/2023 128 (H)     Triglycerides (mg/dL)   Date Value   08/17/2023 105     Lab Results   Component Value Date     (H) 08/17/2023    A1C 6.2 (H) 08/17/2023     Lab Results   Component Value Date    TSH 3.550 08/17/2023       No follow-ups on file.

## 2023-08-23 ENCOUNTER — TELEPHONE (OUTPATIENT)
Dept: FAMILY MEDICINE CLINIC | Facility: CLINIC | Age: 63
End: 2023-08-23

## 2023-09-16 NOTE — TELEPHONE ENCOUNTER
On call provider contacted and recommends eval at [facility] triage [facility]. Patient had PT, INR done yesterday at [facility].

## 2023-09-27 RX ORDER — METOPROLOL TARTRATE 50 MG/1
50 TABLET, FILM COATED ORAL 2 TIMES DAILY
Qty: 180 TABLET | Refills: 1 | Status: SHIPPED | OUTPATIENT
Start: 2023-09-27

## 2023-09-27 NOTE — TELEPHONE ENCOUNTER
Last appt 8/17/23 advised Return in about 6 months (around 2/17/2024) for follow up.       Future Appointments   Date Time Provider Kimberly Zhu   9/28/2023 10:30 AM Marcela Murray MD [facility] [place]

## 2025-07-09 NOTE — TELEPHONE ENCOUNTER
Order faxed to [address] [facility] in [place]. There is 1 Wet Read(s) to document. There are no Wet Read(s) to document.

## (undated) DIAGNOSIS — I10 HYPERTENSION, UNSPECIFIED TYPE: ICD-10-CM

## (undated) DIAGNOSIS — Z79.01 LONG TERM (CURRENT) USE OF ANTICOAGULANTS: ICD-10-CM

## (undated) NOTE — LETTER
04/26/21        Yasmin White  [address]  [address]      Dear Deepti [facility],    [facility] records indicate that you have outstanding lab work and or testing that was ordered for you and has not yet been completed:  Orders Placed This [place]

## (undated) NOTE — LETTER
08/19/19        Giuliana Tinoco  [address]  [address] [place] [address]      Dear Tyler Carter,    [facility] records indicate that you have outstanding lab work and or testing that was ordered for you and has not yet been completed:  Orders Placed This [place]

## (undated) NOTE — LETTER
03/18/19    [facility] 5    RE: INR overdue    Dear Flaquita Delatorre,     It has come to our attention that you are currently overdue for your INR check.  Please contact our office to schedule an appointment as soon

## (undated) NOTE — LETTER
02/22/21    [facility] 5      Re: INR overdue    Dear Sofya Adjutant,    It has come to our attention that you are overdue for an INR test. Your last INR was on 12/20/21.  For your safety while on coumadin therapy